# Patient Record
Sex: MALE | Race: WHITE | NOT HISPANIC OR LATINO | Employment: OTHER | URBAN - METROPOLITAN AREA
[De-identification: names, ages, dates, MRNs, and addresses within clinical notes are randomized per-mention and may not be internally consistent; named-entity substitution may affect disease eponyms.]

---

## 2017-01-16 ENCOUNTER — OFFICE VISIT (OUTPATIENT)
Dept: NEUROSURGERY | Facility: CLINIC | Age: 52
End: 2017-01-16

## 2017-01-16 ENCOUNTER — TRANSCRIBE ORDERS (OUTPATIENT)
Dept: NEUROSURGERY | Facility: CLINIC | Age: 52
End: 2017-01-16

## 2017-01-16 VITALS
DIASTOLIC BLOOD PRESSURE: 89 MMHG | HEIGHT: 72 IN | WEIGHT: 180 LBS | HEART RATE: 104 BPM | SYSTOLIC BLOOD PRESSURE: 130 MMHG | BODY MASS INDEX: 24.38 KG/M2

## 2017-01-16 DIAGNOSIS — M51.16 LUMBAR DISC HERNIATION WITH RADICULOPATHY: Primary | ICD-10-CM

## 2017-01-16 DIAGNOSIS — M51.36 DEGENERATION OF LUMBAR INTERVERTEBRAL DISC: ICD-10-CM

## 2017-01-16 PROBLEM — M51.369 DEGENERATION OF LUMBAR INTERVERTEBRAL DISC: Status: ACTIVE | Noted: 2017-01-16

## 2017-01-16 PROCEDURE — 99244 OFF/OP CNSLTJ NEW/EST MOD 40: CPT | Performed by: NEUROLOGICAL SURGERY

## 2017-01-16 RX ORDER — CYANOCOBALAMIN 1000 UG/ML
1000 INJECTION, SOLUTION INTRAMUSCULAR; SUBCUTANEOUS
COMMUNITY
Start: 2016-11-06

## 2017-01-16 RX ORDER — OMEPRAZOLE 20 MG/1
20 CAPSULE, DELAYED RELEASE ORAL DAILY
COMMUNITY

## 2017-01-16 RX ORDER — DIPHENOXYLATE HYDROCHLORIDE AND ATROPINE SULFATE 2.5; .025 MG/1; MG/1
1 TABLET ORAL 4 TIMES DAILY PRN
COMMUNITY

## 2017-01-16 RX ORDER — DULOXETIN HYDROCHLORIDE 60 MG/1
90 CAPSULE, DELAYED RELEASE ORAL DAILY
COMMUNITY

## 2017-01-16 RX ORDER — HYDROCODONE BITARTRATE AND ACETAMINOPHEN 7.5; 325 MG/1; MG/1
1 TABLET ORAL EVERY 6 HOURS PRN
COMMUNITY
Start: 2017-01-04 | End: 2017-03-10 | Stop reason: HOSPADM

## 2017-01-16 RX ORDER — PROMETHAZINE HYDROCHLORIDE 25 MG/1
25 TABLET ORAL EVERY 6 HOURS PRN
COMMUNITY
Start: 2017-01-11

## 2017-01-16 RX ORDER — MAGNESIUM GLUCONATE 27 MG(500)
500 TABLET ORAL 2 TIMES DAILY
COMMUNITY
End: 2020-12-30

## 2017-01-16 RX ORDER — CLONAZEPAM 0.5 MG/1
0.5 TABLET ORAL 3 TIMES DAILY PRN
COMMUNITY
Start: 2017-01-11

## 2017-01-16 RX ORDER — GABAPENTIN 300 MG/1
300 CAPSULE ORAL 2 TIMES DAILY
Qty: 60 CAPSULE | Refills: 3 | Status: SHIPPED | OUTPATIENT
Start: 2017-01-16 | End: 2017-05-05 | Stop reason: SDUPTHER

## 2017-01-16 RX ORDER — CYCLOBENZAPRINE HCL 10 MG
10 TABLET ORAL 4 TIMES DAILY
COMMUNITY
Start: 2017-01-05

## 2017-01-16 NOTE — LETTER
January 16, 2017     Skip Marley MD  [address]  [address]  [facility] [address]    Patient: Jeronimo Dai   YOB: 1965   Date of Visit: 1/16/2017       Dear Dr. Ayaka MD:    Thank you for referring Jeronimo Dai to me for evaluation. Below are the relevant portions of my assessment and plan of care.      Independent Review of Radiographic Studies:    I reviewed the lumbar MRI done recently on 11/30/16 which showed a very large left L5-S1 recurrent herniated disc and a smaller right L4 5 recurrent herniated disc.  I agree with the report.      Medical Decision Making:    I described and recommended an open lumbar left L5/S1 microdiscectomy.  The goal of surgery is relief of radiating leg pain with improvement of numbness, tingling, walking, and quality of life.  This will not help or hinder low back pain.  The risks include, but are not limited to, infection, hemorrhage on transfusion or reoperation, CSF leak requiring reoperation, incomplete relief of symptoms, potential need for additional surgeries in the future including a fusion, stroke, paralysis, coma, and death.  The patient will find out if I am on his provider list, and if not, we will send him to Dr. Britni Horn. If so, we'll move forward with the surgery at [facility].   I've given him some gabapentin to help a bit with the sciatic pain.      Jeronimo was seen today for back pain.    Diagnoses and all orders for this visit:    Lumbar disc herniation with radiculopathy    Degeneration of lumbar intervertebral disc    Other orders  -     gabapentin (NEURONTIN) 300 MG capsule; Take 1 capsule by mouth 2 (Two) Times a Day.    No Follow-up on file.                    If you have questions, please do not hesitate to call me. I look forward to following Jeronimo along with you.         Sincerely,        Drew Taylor MD        CC: No Recipients

## 2017-01-16 NOTE — PROGRESS NOTES
Subjective   Patient ID: Jeronimo Dai is a 51 y.o. male is being seen for consultation today at the request of Skip Marley MD for evaluation of back and left leg pain.      The patient notes that his pain symptoms began in October 2016 spontaneously after spending extended time in his bed.  He notes that his leg pain symptoms are worse than his back pain symptoms.    He notes that he has been treated with MARY (last one in November) and has been following with Dr. Marley for pain management.  He notes that he has not been treated with PT for his pain symptoms.  He does note history of previous back surgery x2 by Dr. Hahn in the past.    Back Pain   This is a chronic problem. The current episode started more than 1 month ago. The problem occurs daily. The problem has been gradually worsening since onset. The pain is present in the lumbar spine. The quality of the pain is described as aching. The pain radiates to the left knee and left thigh. Associated symptoms include abdominal pain and numbness. Pertinent negatives include no bladder incontinence, bowel incontinence, tingling or weakness. Treatments tried: MARY. The treatment provided no relief.       The following portions of the patient's history were reviewed and updated as appropriate: allergies, current medications, past family history, past medical history, past social history, past surgical history and problem list.    The patient has a history of having had a left L5-S1 discectomy in 2007 and a right L4-L5 discectomy 2009.  He's were done by Dr. Hahn. There was some residual symptoms in the right leg and leg after the second surgery but the first surgery seemed to have resolved his left leg pain quite well.  He's had some off and on low back pain that he was treated with by a chiropractor.  He has had been on some long-term narcotics for his back pain but was generally doing well until about 3 months ago when he began having severe  recurrent left buttock and leg pain.  He was found to have a recurrent large L5-S1 herniated disc.  He tried blocks which haven't helped.  He has not had physical therapy but he's been to the chiropractor.  He has not been on gabapentin.  He's continue to work on these had trouble doing it.  We discussed the nature of his recurrent disc herniation.  Is quite large.  I don't think it's likely to get better at this point and I think an open revision left L5-S1 microdiscectomy would be helpful.  I don't think anything needs to be done about the recurrence on the right at L4-L5.  Right now he doesn't have radiating leg pain on the right in its just upper buttock pain and some low back pain he knows that there is a possibility he might need to be fused in the future.  He is not sure a month provider list with his insurance.  I think he needs to have the surgery but if I can't do it, we'll send him to Dr. Britni Horn.  I'll give him some gabapentin to tide him over in terms of his radiating leg pain.  He has no motor deficits and mild mechanical signs.  No bowel or bladder incontinence.      Review of Systems   Constitutional: Positive for fatigue.   HENT: Positive for congestion and sinus pressure.    Gastrointestinal: Positive for abdominal pain. Negative for bowel incontinence.   Genitourinary: Negative for bladder incontinence.   Musculoskeletal: Positive for arthralgias, back pain and neck pain.   Allergic/Immunologic: Positive for environmental allergies.   Neurological: Positive for numbness. Negative for tingling and weakness.   Psychiatric/Behavioral: Positive for agitation and dysphoric mood. The patient is nervous/anxious.    All other systems reviewed and are negative.      Objective   Physical Exam   Constitutional: He is oriented to person, place, and time. He appears well-developed and well-nourished.   HENT:   Head: Normocephalic and atraumatic.   Eyes: Conjunctivae and EOM are normal. Pupils are equal,  round, and reactive to light.   Fundoscopic exam:       The right eye shows no papilledema. The right eye shows venous pulsations.        The left eye shows no papilledema. The left eye shows venous pulsations.   Neck: Carotid bruit is not present.   Neurological: He is oriented to person, place, and time. He has a normal Finger-Nose-Finger Test and a normal Heel to Shin Test. Gait normal.   Reflex Scores:       Tricep reflexes are 2+ on the right side and 2+ on the left side.       Bicep reflexes are 2+ on the right side and 2+ on the left side.       Brachioradialis reflexes are 2+ on the right side and 2+ on the left side.       Patellar reflexes are 2+ on the right side and 2+ on the left side.       Achilles reflexes are 2+ on the right side and 2+ on the left side.  Psychiatric: His speech is normal.     Neurologic Exam     Mental Status   Oriented to person, place, and time.   Registration of memory: Good recent and remote memory.   Attention: normal. Concentration: normal.   Speech: speech is normal   Level of consciousness: alert  Knowledge: consistent with education.     Cranial Nerves     CN II   Visual fields full to confrontation.   Visual acuity: normal    CN III, IV, VI   Pupils are equal, round, and reactive to light.  Extraocular motions are normal.     CN V   Facial sensation intact.   Right corneal reflex: normal  Left corneal reflex: normal    CN VII   Facial expression full, symmetric.   Right facial weakness: none  Left facial weakness: none    CN VIII   Hearing: intact    CN IX, X   Palate: symmetric    CN XI   Right sternocleidomastoid strength: normal  Left sternocleidomastoid strength: normal    CN XII   Tongue: not atrophic  Tongue deviation: none    Motor Exam   Muscle bulk: normal  Right arm tone: normal  Left arm tone: normal  Right leg tone: normal  Left leg tone: normal    Strength   Strength 5/5 except as noted.     Sensory Exam   Light touch normal.     Gait, Coordination, and  Reflexes     Gait  Gait: normal    Coordination   Finger to nose coordination: normal  Heel to shin coordination: normal    Reflexes   Right brachioradialis: 2+  Left brachioradialis: 2+  Right biceps: 2+  Left biceps: 2+  Right triceps: 2+  Left triceps: 2+  Right patellar: 2+  Left patellar: 2+  Right achilles: 2+  Left achilles: 2+  Right : 2+  Left : 2+      Assessment/Plan   Independent Review of Radiographic Studies:    I reviewed the lumbar MRI done recently on 11/30/16 which showed a very large left L5-S1 recurrent herniated disc and a smaller right L4 5 recurrent herniated disc.  I agree with the report.      Medical Decision Making:    I described and recommended an open lumbar left L5/S1 microdiscectomy.  The goal of surgery is relief of radiating leg pain with improvement of numbness, tingling, walking, and quality of life.  This will not help or hinder low back pain.  The risks include, but are not limited to, infection, hemorrhage on transfusion or reoperation, CSF leak requiring reoperation, incomplete relief of symptoms, potential need for additional surgeries in the future including a fusion, stroke, paralysis, coma, and death.  The patient will find out if I am on his provider list, and if not, we will send him to Dr. Britni Horn. If so, we'll move forward with the surgery at [facility].   I've given him some gabapentin to help a bit with the sciatic pain.      Jeronimo was seen today for back pain.    Diagnoses and all orders for this visit:    Lumbar disc herniation with radiculopathy    Degeneration of lumbar intervertebral disc    Other orders  -     gabapentin (NEURONTIN) 300 MG capsule; Take 1 capsule by mouth 2 (Two) Times a Day.    No Follow-up on file.

## 2017-02-15 ENCOUNTER — OFFICE (OUTPATIENT)
Dept: URBAN - METROPOLITAN AREA OTHER 6 | Facility: OTHER | Age: 52
End: 2017-02-15

## 2017-02-15 VITALS
HEART RATE: 88 BPM | HEIGHT: 72 IN | WEIGHT: 190 LBS | DIASTOLIC BLOOD PRESSURE: 90 MMHG | SYSTOLIC BLOOD PRESSURE: 130 MMHG

## 2017-02-15 DIAGNOSIS — R11.0 NAUSEA: ICD-10-CM

## 2017-02-15 DIAGNOSIS — K50.90 CROHN'S DISEASE, UNSPECIFIED, WITHOUT COMPLICATIONS: ICD-10-CM

## 2017-02-15 DIAGNOSIS — A04.9 BACTERIAL INTESTINAL INFECTION, UNSPECIFIED: ICD-10-CM

## 2017-02-15 PROCEDURE — 99212 OFFICE O/P EST SF 10 MIN: CPT | Performed by: INTERNAL MEDICINE

## 2017-02-21 ENCOUNTER — TRANSCRIBE ORDERS (OUTPATIENT)
Dept: NEUROSURGERY | Facility: CLINIC | Age: 52
End: 2017-02-21

## 2017-03-03 ENCOUNTER — APPOINTMENT (OUTPATIENT)
Dept: PREADMISSION TESTING | Facility: HOSPITAL | Age: 52
End: 2017-03-03

## 2017-03-06 ENCOUNTER — HOSPITAL ENCOUNTER (OUTPATIENT)
Dept: GENERAL RADIOLOGY | Facility: HOSPITAL | Age: 52
Discharge: HOME | End: 2017-03-06
Attending: NEUROLOGICAL SURGERY | Admitting: NEUROLOGICAL SURGERY

## 2017-03-06 ENCOUNTER — APPOINTMENT (OUTPATIENT)
Dept: PREADMISSION TESTING | Facility: HOSPITAL | Age: 52
End: 2017-03-06

## 2017-03-06 VITALS
HEART RATE: 106 BPM | SYSTOLIC BLOOD PRESSURE: 127 MMHG | OXYGEN SATURATION: 97 % | WEIGHT: 201.9 LBS | RESPIRATION RATE: 16 BRPM | HEIGHT: 72 IN | DIASTOLIC BLOOD PRESSURE: 92 MMHG | BODY MASS INDEX: 27.35 KG/M2 | TEMPERATURE: 97.8 F

## 2017-03-06 LAB
ANION GAP SERPL CALCULATED.3IONS-SCNC: 15.3 MMOL/L
APTT PPP: 28.3 SECONDS (ref 22.7–35.4)
BASOPHILS # BLD AUTO: 0.02 10*3/MM3 (ref 0–0.2)
BASOPHILS NFR BLD AUTO: 0.3 % (ref 0–1.5)
BILIRUB UR QL STRIP: NEGATIVE
BUN BLD-MCNC: 9 MG/DL (ref 6–20)
BUN/CREAT SERPL: 11 (ref 7–25)
CALCIUM SPEC-SCNC: 9.2 MG/DL (ref 8.6–10.5)
CHLORIDE SERPL-SCNC: 94 MMOL/L (ref 98–107)
CLARITY UR: CLEAR
CO2 SERPL-SCNC: 26.7 MMOL/L (ref 22–29)
COLOR UR: YELLOW
CREAT BLD-MCNC: 0.82 MG/DL (ref 0.76–1.27)
DEPRECATED RDW RBC AUTO: 42.5 FL (ref 37–54)
EOSINOPHIL # BLD AUTO: 0.06 10*3/MM3 (ref 0–0.7)
EOSINOPHIL NFR BLD AUTO: 0.8 % (ref 0.3–6.2)
ERYTHROCYTE [DISTWIDTH] IN BLOOD BY AUTOMATED COUNT: 12.8 % (ref 11.5–14.5)
GFR SERPL CREATININE-BSD FRML MDRD: 98 ML/MIN/1.73
GLUCOSE BLD-MCNC: 124 MG/DL (ref 65–99)
GLUCOSE UR STRIP-MCNC: NEGATIVE MG/DL
HCT VFR BLD AUTO: 42 % (ref 40.4–52.2)
HGB BLD-MCNC: 14.2 G/DL (ref 13.7–17.6)
HGB UR QL STRIP.AUTO: NEGATIVE
IMM GRANULOCYTES # BLD: 0.02 10*3/MM3 (ref 0–0.03)
IMM GRANULOCYTES NFR BLD: 0.3 % (ref 0–0.5)
INR PPP: 1.02 (ref 0.9–1.1)
KETONES UR QL STRIP: NEGATIVE
LEUKOCYTE ESTERASE UR QL STRIP.AUTO: NEGATIVE
LYMPHOCYTES # BLD AUTO: 2.28 10*3/MM3 (ref 0.9–4.8)
LYMPHOCYTES NFR BLD AUTO: 29.9 % (ref 19.6–45.3)
MCH RBC QN AUTO: 31 PG (ref 27–32.7)
MCHC RBC AUTO-ENTMCNC: 33.8 G/DL (ref 32.6–36.4)
MCV RBC AUTO: 91.7 FL (ref 79.8–96.2)
MONOCYTES # BLD AUTO: 0.47 10*3/MM3 (ref 0.2–1.2)
MONOCYTES NFR BLD AUTO: 6.2 % (ref 5–12)
NEUTROPHILS # BLD AUTO: 4.77 10*3/MM3 (ref 1.9–8.1)
NEUTROPHILS NFR BLD AUTO: 62.5 % (ref 42.7–76)
NITRITE UR QL STRIP: NEGATIVE
PH UR STRIP.AUTO: 7 [PH] (ref 5–8)
PLATELET # BLD AUTO: 214 10*3/MM3 (ref 140–500)
PMV BLD AUTO: 11.5 FL (ref 6–12)
POTASSIUM BLD-SCNC: 4.2 MMOL/L (ref 3.5–5.2)
PROT UR QL STRIP: NEGATIVE
PROTHROMBIN TIME: 13 SECONDS (ref 11.7–14.2)
RBC # BLD AUTO: 4.58 10*6/MM3 (ref 4.6–6)
SODIUM BLD-SCNC: 136 MMOL/L (ref 136–145)
SP GR UR STRIP: 1.01 (ref 1–1.03)
UROBILINOGEN UR QL STRIP: NORMAL
WBC NRBC COR # BLD: 7.62 10*3/MM3 (ref 4.5–10.7)

## 2017-03-06 NOTE — DISCHARGE INSTRUCTIONS
ARRIVAL TIME  06:00        General Instructions:  • Do not eat or drink after midnight: includes water, mints, or gum. You may brush your teeth.  • Do not smoke, chew tobacco, or drink alcohol.  • The Pre-Admission Testing nurse will instruct you to bring medications if unable to obtain an accurate list in Pre-Admission Testing.    • If applicable bring your C-PAP/ BI-PAP machine.  • Bring any papers given to you in the doctor’s office.  • Wear clean comfortable clothes and socks.  • Do not wear contact lenses or make-up.  Bring a case for your glasses if applicable.   • Bring crutches or walker if applicable.  • Leave all other valuables and jewelry at home.        Preventing a Surgical Site Infection:  Shower on the morning of surgery using a fresh bar of anti-bacterial soap (such as Dial) and clean washcloth.  Dry with a clean towel and dress in clean clothing.  For 2 to 3 days before surgery, avoid shaving with a razor near where you will have surgery because the razor can irritate skin and make it easier to develop an infection  Ask your surgeon if you will be receiving antibiotics prior to surgery  Make sure you, your family, and all healthcare providers clean their hands with soap and water or an alcohol based hand  before caring for you or your wound  If at all possible, quit smoking as many days before surgery as you can.    Day of surgery:  Upon arrival, a Pre-op nurse and Anesthesiologist will review your health history, obtain vital signs, and answer questions you may have.  The only belongings needed at this time will be your home medications and if applicable your C-PAP/BI-PAP machine.  If you are staying overnight your family can leave the rest of your belongings in the car and bring them to your room later.  A Pre-op nurse will start an IV and you may receive medication in preparation for surgery, including something to help you relax.  Your family will be able to see you in the Pre-op area.   While you are in surgery your family should notify the waiting room  if they leave the waiting room area and provide a contact phone number.    Please be aware that surgery does come with discomfort.  We want to make every effort to control your discomfort so please discuss any uncontrolled symptoms with your nurse.   Your doctor will most likely have prescribed pain medications.      If you are going home after surgery you will receive individualized written care instructions before being discharged.  A responsible adult must drive you to and from the hospital on the day of your surgery and stay with you for 24 hours.    If you are staying overnight following surgery, you will be transported to your hospital room following the recovery period.  [facility] has all private rooms.    If you have any questions please call Pre-Admission Testing at [phone].  Deductibles and co-payments are collected on the day of service. Please be prepared to pay the required co-pay, deductible or deposit on the day of service as defined by your plan.

## 2017-03-09 ENCOUNTER — APPOINTMENT (OUTPATIENT)
Dept: GENERAL RADIOLOGY | Facility: HOSPITAL | Age: 52
End: 2017-03-09

## 2017-03-09 ENCOUNTER — ANESTHESIA (OUTPATIENT)
Dept: PERIOP | Facility: HOSPITAL | Age: 52
End: 2017-03-09

## 2017-03-09 ENCOUNTER — ANESTHESIA EVENT (OUTPATIENT)
Dept: PERIOP | Facility: HOSPITAL | Age: 52
End: 2017-03-09

## 2017-03-09 ENCOUNTER — HOSPITAL ENCOUNTER (OUTPATIENT)
Facility: HOSPITAL | Age: 52
Setting detail: OBSERVATION
Discharge: HOME | End: 2017-03-10
Attending: NEUROLOGICAL SURGERY | Admitting: NEUROLOGICAL SURGERY

## 2017-03-09 PROCEDURE — 25010000002 ONDANSETRON PER 1 MG: Performed by: NURSE ANESTHETIST, CERTIFIED REGISTERED

## 2017-03-09 PROCEDURE — 63042 LAMINOTOMY SINGLE LUMBAR: CPT | Performed by: NEUROLOGICAL SURGERY

## 2017-03-09 PROCEDURE — 25010000002 FENTANYL CITRATE (PF) 100 MCG/2ML SOLUTION: Performed by: NURSE ANESTHETIST, CERTIFIED REGISTERED

## 2017-03-09 PROCEDURE — 25010000002 HYDROMORPHONE PER 4 MG: Performed by: NURSE ANESTHETIST, CERTIFIED REGISTERED

## 2017-03-09 PROCEDURE — 25010000002 ONDANSETRON PER 1 MG: Performed by: NEUROLOGICAL SURGERY

## 2017-03-09 PROCEDURE — 76000 FLUOROSCOPY <1 HR PHYS/QHP: CPT

## 2017-03-09 PROCEDURE — 25010000002 VANCOMYCIN PER 500 MG: Performed by: NEUROLOGICAL SURGERY

## 2017-03-09 PROCEDURE — G0378 HOSPITAL OBSERVATION PER HR: HCPCS

## 2017-03-09 PROCEDURE — 25010000002 METHYLPREDNISOLONE PER 80 MG: Performed by: NEUROLOGICAL SURGERY

## 2017-03-09 PROCEDURE — 72020 X-RAY EXAM OF SPINE 1 VIEW: CPT

## 2017-03-09 PROCEDURE — 25010000002 DEXAMETHASONE PER 1 MG: Performed by: NURSE ANESTHETIST, CERTIFIED REGISTERED

## 2017-03-09 PROCEDURE — 63044 LAMINOTOMY ADDL LUMBAR: CPT | Performed by: NEUROLOGICAL SURGERY

## 2017-03-09 PROCEDURE — 25010000002 HYDROMORPHONE PER 4 MG

## 2017-03-09 PROCEDURE — 25010000002 PHENYLEPHRINE PER 1 ML: Performed by: NURSE ANESTHETIST, CERTIFIED REGISTERED

## 2017-03-09 PROCEDURE — 25010000002 FENTANYL CITRATE (PF) 100 MCG/2ML SOLUTION

## 2017-03-09 PROCEDURE — 63710000001 PROMETHAZINE PER 25 MG: Performed by: NEUROLOGICAL SURGERY

## 2017-03-09 PROCEDURE — 63710000001 DIPHENHYDRAMINE PER 50 MG: Performed by: NEUROLOGICAL SURGERY

## 2017-03-09 PROCEDURE — 25010000002 VANCOMYCIN: Performed by: NEUROLOGICAL SURGERY

## 2017-03-09 PROCEDURE — 25010000002 PROPOFOL 10 MG/ML EMULSION: Performed by: NURSE ANESTHETIST, CERTIFIED REGISTERED

## 2017-03-09 PROCEDURE — 94799 UNLISTED PULMONARY SVC/PX: CPT

## 2017-03-09 PROCEDURE — 25010000002 MIDAZOLAM PER 1 MG: Performed by: ANESTHESIOLOGY

## 2017-03-09 PROCEDURE — 25010000002 NEOSTIGMINE 10 MG/10ML SOLUTION: Performed by: NURSE ANESTHETIST, CERTIFIED REGISTERED

## 2017-03-09 RX ORDER — DEXAMETHASONE SODIUM PHOSPHATE 10 MG/ML
INJECTION INTRAMUSCULAR; INTRAVENOUS AS NEEDED
Status: DISCONTINUED | OUTPATIENT
Start: 2017-03-09 | End: 2017-03-09 | Stop reason: SURG

## 2017-03-09 RX ORDER — HYDROCODONE BITARTRATE AND ACETAMINOPHEN 7.5; 325 MG/1; MG/1
2 TABLET ORAL EVERY 4 HOURS PRN
Status: DISCONTINUED | OUTPATIENT
Start: 2017-03-09 | End: 2017-03-10 | Stop reason: HOSPADM

## 2017-03-09 RX ORDER — CYCLOBENZAPRINE HCL 10 MG
10 TABLET ORAL 3 TIMES DAILY PRN
Status: DISCONTINUED | OUTPATIENT
Start: 2017-03-09 | End: 2017-03-10 | Stop reason: HOSPADM

## 2017-03-09 RX ORDER — NALOXONE HCL 0.4 MG/ML
0.1 VIAL (ML) INJECTION
Status: DISCONTINUED | OUTPATIENT
Start: 2017-03-09 | End: 2017-03-10 | Stop reason: HOSPADM

## 2017-03-09 RX ORDER — ONDANSETRON 2 MG/ML
4 INJECTION INTRAMUSCULAR; INTRAVENOUS EVERY 6 HOURS PRN
Status: DISCONTINUED | OUTPATIENT
Start: 2017-03-09 | End: 2017-03-10 | Stop reason: HOSPADM

## 2017-03-09 RX ORDER — GLYCOPYRROLATE 0.2 MG/ML
INJECTION INTRAMUSCULAR; INTRAVENOUS AS NEEDED
Status: DISCONTINUED | OUTPATIENT
Start: 2017-03-09 | End: 2017-03-09 | Stop reason: SURG

## 2017-03-09 RX ORDER — DIPHENHYDRAMINE HCL 25 MG
25 CAPSULE ORAL EVERY 6 HOURS PRN
COMMUNITY

## 2017-03-09 RX ORDER — ONDANSETRON 2 MG/ML
4 INJECTION INTRAMUSCULAR; INTRAVENOUS ONCE AS NEEDED
Status: DISCONTINUED | OUTPATIENT
Start: 2017-03-09 | End: 2017-03-09 | Stop reason: HOSPADM

## 2017-03-09 RX ORDER — SODIUM CHLORIDE, SODIUM LACTATE, POTASSIUM CHLORIDE, CALCIUM CHLORIDE 600; 310; 30; 20 MG/100ML; MG/100ML; MG/100ML; MG/100ML
9 INJECTION, SOLUTION INTRAVENOUS CONTINUOUS
Status: DISCONTINUED | OUTPATIENT
Start: 2017-03-09 | End: 2017-03-09

## 2017-03-09 RX ORDER — NEOSTIGMINE METHYLSULFATE 1 MG/ML
INJECTION, SOLUTION INTRAVENOUS AS NEEDED
Status: DISCONTINUED | OUTPATIENT
Start: 2017-03-09 | End: 2017-03-09 | Stop reason: SURG

## 2017-03-09 RX ORDER — FENTANYL CITRATE 50 UG/ML
50 INJECTION, SOLUTION INTRAMUSCULAR; INTRAVENOUS
Status: DISCONTINUED | OUTPATIENT
Start: 2017-03-09 | End: 2017-03-09 | Stop reason: HOSPADM

## 2017-03-09 RX ORDER — PROMETHAZINE HYDROCHLORIDE 25 MG/1
25 SUPPOSITORY RECTAL ONCE AS NEEDED
Status: DISCONTINUED | OUTPATIENT
Start: 2017-03-09 | End: 2017-03-09 | Stop reason: HOSPADM

## 2017-03-09 RX ORDER — NALOXONE HCL 0.4 MG/ML
0.2 VIAL (ML) INJECTION AS NEEDED
Status: DISCONTINUED | OUTPATIENT
Start: 2017-03-09 | End: 2017-03-09 | Stop reason: HOSPADM

## 2017-03-09 RX ORDER — HYDROCODONE BITARTRATE AND ACETAMINOPHEN 7.5; 325 MG/1; MG/1
1 TABLET ORAL ONCE AS NEEDED
Status: DISCONTINUED | OUTPATIENT
Start: 2017-03-09 | End: 2017-03-09 | Stop reason: HOSPADM

## 2017-03-09 RX ORDER — DULOXETIN HYDROCHLORIDE 60 MG/1
60 CAPSULE, DELAYED RELEASE ORAL DAILY
Status: DISCONTINUED | OUTPATIENT
Start: 2017-03-09 | End: 2017-03-10 | Stop reason: HOSPADM

## 2017-03-09 RX ORDER — PROMETHAZINE HYDROCHLORIDE 25 MG/1
25 TABLET ORAL EVERY 6 HOURS PRN
Status: DISCONTINUED | OUTPATIENT
Start: 2017-03-09 | End: 2017-03-10 | Stop reason: HOSPADM

## 2017-03-09 RX ORDER — ACETAMINOPHEN 325 MG/1
650 TABLET ORAL EVERY 4 HOURS PRN
Status: DISCONTINUED | OUTPATIENT
Start: 2017-03-09 | End: 2017-03-10 | Stop reason: HOSPADM

## 2017-03-09 RX ORDER — MIDAZOLAM HYDROCHLORIDE 1 MG/ML
1 INJECTION INTRAMUSCULAR; INTRAVENOUS
Status: DISCONTINUED | OUTPATIENT
Start: 2017-03-09 | End: 2017-03-09 | Stop reason: HOSPADM

## 2017-03-09 RX ORDER — IBUPROFEN 800 MG/1
800 TABLET ORAL EVERY 8 HOURS PRN
COMMUNITY
End: 2017-03-10 | Stop reason: HOSPADM

## 2017-03-09 RX ORDER — HYDROMORPHONE HYDROCHLORIDE 1 MG/ML
0.5 INJECTION, SOLUTION INTRAMUSCULAR; INTRAVENOUS; SUBCUTANEOUS
Status: DISCONTINUED | OUTPATIENT
Start: 2017-03-09 | End: 2017-03-09 | Stop reason: HOSPADM

## 2017-03-09 RX ORDER — DOCUSATE SODIUM 100 MG/1
100 CAPSULE, LIQUID FILLED ORAL 2 TIMES DAILY PRN
Status: DISCONTINUED | OUTPATIENT
Start: 2017-03-09 | End: 2017-03-10 | Stop reason: HOSPADM

## 2017-03-09 RX ORDER — HYDRALAZINE HYDROCHLORIDE 20 MG/ML
5 INJECTION INTRAMUSCULAR; INTRAVENOUS
Status: DISCONTINUED | OUTPATIENT
Start: 2017-03-09 | End: 2017-03-09 | Stop reason: HOSPADM

## 2017-03-09 RX ORDER — DIPHENHYDRAMINE HCL 25 MG
25 CAPSULE ORAL EVERY 6 HOURS PRN
Status: DISCONTINUED | OUTPATIENT
Start: 2017-03-09 | End: 2017-03-10 | Stop reason: HOSPADM

## 2017-03-09 RX ORDER — PROMETHAZINE HYDROCHLORIDE 25 MG/ML
12.5 INJECTION, SOLUTION INTRAMUSCULAR; INTRAVENOUS ONCE AS NEEDED
Status: DISCONTINUED | OUTPATIENT
Start: 2017-03-09 | End: 2017-03-09 | Stop reason: HOSPADM

## 2017-03-09 RX ORDER — DIPHENHYDRAMINE HYDROCHLORIDE 50 MG/ML
12.5 INJECTION INTRAMUSCULAR; INTRAVENOUS
Status: DISCONTINUED | OUTPATIENT
Start: 2017-03-09 | End: 2017-03-09 | Stop reason: HOSPADM

## 2017-03-09 RX ORDER — SODIUM CHLORIDE 0.9 % (FLUSH) 0.9 %
1-10 SYRINGE (ML) INJECTION AS NEEDED
Status: DISCONTINUED | OUTPATIENT
Start: 2017-03-09 | End: 2017-03-10 | Stop reason: HOSPADM

## 2017-03-09 RX ORDER — GABAPENTIN 300 MG/1
300 CAPSULE ORAL 2 TIMES DAILY
Status: DISCONTINUED | OUTPATIENT
Start: 2017-03-10 | End: 2017-03-10 | Stop reason: HOSPADM

## 2017-03-09 RX ORDER — DEXTROSE, SODIUM CHLORIDE, AND POTASSIUM CHLORIDE 5; .45; .15 G/100ML; G/100ML; G/100ML
100 INJECTION INTRAVENOUS CONTINUOUS
Status: DISCONTINUED | OUTPATIENT
Start: 2017-03-09 | End: 2017-03-10 | Stop reason: HOSPADM

## 2017-03-09 RX ORDER — HYDROMORPHONE HCL IN 0.9% NACL 10 MG/50ML
PATIENT CONTROLLED ANALGESIA SYRINGE INTRAVENOUS CONTINUOUS
Status: DISCONTINUED | OUTPATIENT
Start: 2017-03-09 | End: 2017-03-10 | Stop reason: HOSPADM

## 2017-03-09 RX ORDER — FAMOTIDINE 10 MG/ML
20 INJECTION, SOLUTION INTRAVENOUS ONCE
Status: COMPLETED | OUTPATIENT
Start: 2017-03-09 | End: 2017-03-09

## 2017-03-09 RX ORDER — METHYLPREDNISOLONE ACETATE 80 MG/ML
INJECTION, SUSPENSION INTRA-ARTICULAR; INTRALESIONAL; INTRAMUSCULAR; SOFT TISSUE AS NEEDED
Status: DISCONTINUED | OUTPATIENT
Start: 2017-03-09 | End: 2017-03-09 | Stop reason: HOSPADM

## 2017-03-09 RX ORDER — FENTANYL CITRATE 50 UG/ML
INJECTION, SOLUTION INTRAMUSCULAR; INTRAVENOUS
Status: COMPLETED
Start: 2017-03-09 | End: 2017-03-09

## 2017-03-09 RX ORDER — FENTANYL CITRATE 50 UG/ML
INJECTION, SOLUTION INTRAMUSCULAR; INTRAVENOUS AS NEEDED
Status: DISCONTINUED | OUTPATIENT
Start: 2017-03-09 | End: 2017-03-09 | Stop reason: SURG

## 2017-03-09 RX ORDER — BUPIVACAINE HYDROCHLORIDE AND EPINEPHRINE 2.5; 5 MG/ML; UG/ML
INJECTION, SOLUTION INFILTRATION; PERINEURAL AS NEEDED
Status: DISCONTINUED | OUTPATIENT
Start: 2017-03-09 | End: 2017-03-09 | Stop reason: HOSPADM

## 2017-03-09 RX ORDER — MIDAZOLAM HYDROCHLORIDE 1 MG/ML
2 INJECTION INTRAMUSCULAR; INTRAVENOUS
Status: DISCONTINUED | OUTPATIENT
Start: 2017-03-09 | End: 2017-03-09 | Stop reason: HOSPADM

## 2017-03-09 RX ORDER — PROMETHAZINE HYDROCHLORIDE 25 MG/1
25 TABLET ORAL ONCE AS NEEDED
Status: DISCONTINUED | OUTPATIENT
Start: 2017-03-09 | End: 2017-03-09 | Stop reason: HOSPADM

## 2017-03-09 RX ORDER — FLUMAZENIL 0.1 MG/ML
0.2 INJECTION INTRAVENOUS AS NEEDED
Status: DISCONTINUED | OUTPATIENT
Start: 2017-03-09 | End: 2017-03-09 | Stop reason: HOSPADM

## 2017-03-09 RX ORDER — ROCURONIUM BROMIDE 10 MG/ML
INJECTION, SOLUTION INTRAVENOUS AS NEEDED
Status: DISCONTINUED | OUTPATIENT
Start: 2017-03-09 | End: 2017-03-09 | Stop reason: SURG

## 2017-03-09 RX ORDER — HYDROMORPHONE HCL IN 0.9% NACL 10 MG/50ML
PATIENT CONTROLLED ANALGESIA SYRINGE INTRAVENOUS
Status: COMPLETED
Start: 2017-03-09 | End: 2017-03-09

## 2017-03-09 RX ORDER — GABAPENTIN 300 MG/1
300 CAPSULE ORAL 2 TIMES DAILY
Status: DISCONTINUED | OUTPATIENT
Start: 2017-03-09 | End: 2017-03-09 | Stop reason: DRUGHIGH

## 2017-03-09 RX ORDER — PROMETHAZINE HYDROCHLORIDE 25 MG/1
12.5 TABLET ORAL ONCE AS NEEDED
Status: DISCONTINUED | OUTPATIENT
Start: 2017-03-09 | End: 2017-03-09 | Stop reason: HOSPADM

## 2017-03-09 RX ORDER — ONDANSETRON 2 MG/ML
INJECTION INTRAMUSCULAR; INTRAVENOUS AS NEEDED
Status: DISCONTINUED | OUTPATIENT
Start: 2017-03-09 | End: 2017-03-09 | Stop reason: SURG

## 2017-03-09 RX ORDER — SODIUM CHLORIDE 0.9 % (FLUSH) 0.9 %
1-10 SYRINGE (ML) INJECTION AS NEEDED
Status: DISCONTINUED | OUTPATIENT
Start: 2017-03-09 | End: 2017-03-09 | Stop reason: HOSPADM

## 2017-03-09 RX ORDER — PROPOFOL 10 MG/ML
VIAL (ML) INTRAVENOUS AS NEEDED
Status: DISCONTINUED | OUTPATIENT
Start: 2017-03-09 | End: 2017-03-09 | Stop reason: SURG

## 2017-03-09 RX ORDER — LABETALOL HYDROCHLORIDE 5 MG/ML
5 INJECTION, SOLUTION INTRAVENOUS
Status: DISCONTINUED | OUTPATIENT
Start: 2017-03-09 | End: 2017-03-09 | Stop reason: HOSPADM

## 2017-03-09 RX ORDER — PANTOPRAZOLE SODIUM 40 MG/1
40 TABLET, DELAYED RELEASE ORAL
Status: DISCONTINUED | OUTPATIENT
Start: 2017-03-09 | End: 2017-03-10 | Stop reason: HOSPADM

## 2017-03-09 RX ORDER — LIDOCAINE HYDROCHLORIDE 20 MG/ML
INJECTION, SOLUTION INFILTRATION; PERINEURAL AS NEEDED
Status: DISCONTINUED | OUTPATIENT
Start: 2017-03-09 | End: 2017-03-09 | Stop reason: SURG

## 2017-03-09 RX ORDER — CLONAZEPAM 0.5 MG/1
0.5 TABLET ORAL 3 TIMES DAILY PRN
Status: DISCONTINUED | OUTPATIENT
Start: 2017-03-09 | End: 2017-03-10 | Stop reason: HOSPADM

## 2017-03-09 RX ORDER — VANCOMYCIN HYDROCHLORIDE 1 G/200ML
1000 INJECTION, SOLUTION INTRAVENOUS EVERY 12 HOURS
Status: COMPLETED | OUTPATIENT
Start: 2017-03-09 | End: 2017-03-10

## 2017-03-09 RX ORDER — OXYCODONE AND ACETAMINOPHEN 7.5; 325 MG/1; MG/1
1 TABLET ORAL ONCE AS NEEDED
Status: COMPLETED | OUTPATIENT
Start: 2017-03-09 | End: 2017-03-09

## 2017-03-09 RX ADMIN — PROMETHAZINE HYDROCHLORIDE 25 MG: 25 TABLET ORAL at 21:40

## 2017-03-09 RX ADMIN — FENTANYL CITRATE 50 MCG: 50 INJECTION INTRAMUSCULAR; INTRAVENOUS at 10:49

## 2017-03-09 RX ADMIN — ONDANSETRON 4 MG: 2 INJECTION INTRAMUSCULAR; INTRAVENOUS at 19:34

## 2017-03-09 RX ADMIN — SODIUM CHLORIDE, POTASSIUM CHLORIDE, SODIUM LACTATE AND CALCIUM CHLORIDE: 600; 310; 30; 20 INJECTION, SOLUTION INTRAVENOUS at 08:09

## 2017-03-09 RX ADMIN — VANCOMYCIN HYDROCHLORIDE 1250 MG: 1 INJECTION, POWDER, LYOPHILIZED, FOR SOLUTION INTRAVENOUS at 08:01

## 2017-03-09 RX ADMIN — MIDAZOLAM HYDROCHLORIDE 2 MG: 1 INJECTION, SOLUTION INTRAMUSCULAR; INTRAVENOUS at 07:04

## 2017-03-09 RX ADMIN — HYDROCODONE BITARTRATE AND ACETAMINOPHEN 2 TABLET: 7.5; 325 TABLET ORAL at 13:59

## 2017-03-09 RX ADMIN — FAMOTIDINE 20 MG: 10 INJECTION, SOLUTION INTRAVENOUS at 07:04

## 2017-03-09 RX ADMIN — DIPHENHYDRAMINE HYDROCHLORIDE 25 MG: 25 CAPSULE ORAL at 21:42

## 2017-03-09 RX ADMIN — HYDROMORPHONE HYDROCHLORIDE 0.5 MG: 1 INJECTION, SOLUTION INTRAMUSCULAR; INTRAVENOUS; SUBCUTANEOUS at 12:10

## 2017-03-09 RX ADMIN — SODIUM CHLORIDE, POTASSIUM CHLORIDE, SODIUM LACTATE AND CALCIUM CHLORIDE: 600; 310; 30; 20 INJECTION, SOLUTION INTRAVENOUS at 10:00

## 2017-03-09 RX ADMIN — FENTANYL CITRATE 50 MCG: 50 INJECTION INTRAMUSCULAR; INTRAVENOUS at 11:24

## 2017-03-09 RX ADMIN — CYCLOBENZAPRINE HYDROCHLORIDE 10 MG: 10 TABLET, FILM COATED ORAL at 23:48

## 2017-03-09 RX ADMIN — CYCLOBENZAPRINE HYDROCHLORIDE 10 MG: 10 TABLET, FILM COATED ORAL at 15:52

## 2017-03-09 RX ADMIN — GABAPENTIN 300 MG: 300 CAPSULE ORAL at 15:52

## 2017-03-09 RX ADMIN — PROPOFOL 200 MG: 10 INJECTION, EMULSION INTRAVENOUS at 08:12

## 2017-03-09 RX ADMIN — ONDANSETRON 4 MG: 2 INJECTION INTRAMUSCULAR; INTRAVENOUS at 10:44

## 2017-03-09 RX ADMIN — LIDOCAINE HYDROCHLORIDE 100 MG: 20 INJECTION, SOLUTION INFILTRATION; PERINEURAL at 08:12

## 2017-03-09 RX ADMIN — FENTANYL CITRATE 50 MCG: 50 INJECTION INTRAMUSCULAR; INTRAVENOUS at 12:21

## 2017-03-09 RX ADMIN — POTASSIUM CHLORIDE, DEXTROSE MONOHYDRATE AND SODIUM CHLORIDE 100 ML/HR: 150; 5; 450 INJECTION, SOLUTION INTRAVENOUS at 13:59

## 2017-03-09 RX ADMIN — FENTANYL CITRATE 50 MCG: 50 INJECTION INTRAMUSCULAR; INTRAVENOUS at 11:45

## 2017-03-09 RX ADMIN — PHENYLEPHRINE HYDROCHLORIDE 100 MCG: 10 INJECTION INTRAVENOUS at 09:14

## 2017-03-09 RX ADMIN — DEXAMETHASONE SODIUM PHOSPHATE 4 MG: 10 INJECTION INTRAMUSCULAR; INTRAVENOUS at 08:21

## 2017-03-09 RX ADMIN — HYDROMORPHONE HYDROCHLORIDE 0.5 MG: 1 INJECTION, SOLUTION INTRAMUSCULAR; INTRAVENOUS; SUBCUTANEOUS at 11:24

## 2017-03-09 RX ADMIN — HYDROCODONE BITARTRATE AND ACETAMINOPHEN 2 TABLET: 7.5; 325 TABLET ORAL at 17:55

## 2017-03-09 RX ADMIN — PHENYLEPHRINE HYDROCHLORIDE 100 MCG: 10 INJECTION INTRAVENOUS at 10:22

## 2017-03-09 RX ADMIN — NEOSTIGMINE METHYLSULFATE 3 MG: 1 INJECTION INTRAVENOUS at 10:44

## 2017-03-09 RX ADMIN — DULOXETINE HYDROCHLORIDE 60 MG: 60 CAPSULE, DELAYED RELEASE ORAL at 15:52

## 2017-03-09 RX ADMIN — VANCOMYCIN HYDROCHLORIDE 1250 MG: 1 INJECTION, POWDER, LYOPHILIZED, FOR SOLUTION INTRAVENOUS at 08:09

## 2017-03-09 RX ADMIN — DIPHENHYDRAMINE HYDROCHLORIDE 25 MG: 25 CAPSULE ORAL at 13:59

## 2017-03-09 RX ADMIN — GLYCOPYRROLATE 0.5 MG: 0.2 INJECTION INTRAMUSCULAR; INTRAVENOUS at 10:44

## 2017-03-09 RX ADMIN — VANCOMYCIN HYDROCHLORIDE 1000 MG: 1 INJECTION, SOLUTION INTRAVENOUS at 15:52

## 2017-03-09 RX ADMIN — FENTANYL CITRATE 50 MCG: 50 INJECTION INTRAMUSCULAR; INTRAVENOUS at 12:54

## 2017-03-09 RX ADMIN — FENTANYL CITRATE 50 MCG: 50 INJECTION INTRAMUSCULAR; INTRAVENOUS at 08:33

## 2017-03-09 RX ADMIN — FENTANYL CITRATE 50 MCG: 50 INJECTION INTRAMUSCULAR; INTRAVENOUS at 08:12

## 2017-03-09 RX ADMIN — PHENYLEPHRINE HYDROCHLORIDE 100 MCG: 10 INJECTION INTRAVENOUS at 09:02

## 2017-03-09 RX ADMIN — OXYCODONE HYDROCHLORIDE AND ACETAMINOPHEN 1 TABLET: 7.5; 325 TABLET ORAL at 12:20

## 2017-03-09 RX ADMIN — HYDROCODONE BITARTRATE AND ACETAMINOPHEN 2 TABLET: 7.5; 325 TABLET ORAL at 23:48

## 2017-03-09 RX ADMIN — Medication: at 11:42

## 2017-03-09 RX ADMIN — ROCURONIUM BROMIDE 40 MG: 10 INJECTION INTRAVENOUS at 08:12

## 2017-03-09 RX ADMIN — PHENYLEPHRINE HYDROCHLORIDE 100 MCG: 10 INJECTION INTRAVENOUS at 09:30

## 2017-03-09 RX ADMIN — PHENYLEPHRINE HYDROCHLORIDE 100 MCG: 10 INJECTION INTRAVENOUS at 08:50

## 2017-03-09 RX ADMIN — PHENYLEPHRINE HYDROCHLORIDE 100 MCG: 10 INJECTION INTRAVENOUS at 09:18

## 2017-03-09 NOTE — PROGRESS NOTES
Discharge Planning Assessment  [facility]     Patient Name: Jeronimo Dai  MRN: [id number]  Today's Date: 3/9/2017    Admit Date: 3/9/2017          Discharge Needs Assessment       03/09/17 1447    Living Environment    Lives With alone    Provides Primary Care For no one    Quality Of Family Relationships supportive    Able to Return to Prior Living Arrangements yes    Discharge Needs Assessment    Readmission Within The Last 30 Days no previous admission in last 30 days    Equipment Currently Used at Home none    Equipment Needed After Discharge none    Transportation Available car;family or friend will provide    Discharge Disposition home or self-care            Discharge Plan       03/09/17 1449    Case Management/Social Work Plan    Plan To friend's home for assistance    Patient/Family In Agreement With Plan yes    Additional Comments Met with patient and friend at bedside.  Patient lives alone in an apartment.  Plan is to go to his friend's home at discharge.  Patient was indep with all activities prior to surgery.  Verified facesheet info.        Discharge Placement     No information found                Demographic Summary       03/09/17 1446    Referral Information    Admission Type observation    Arrived From operating room    Referral Source physician    Reason For Consult discharge planning    Record Reviewed medical record    Primary Care Physician Information    Name Dr. Clark Telles            Functional Status       03/09/17 1446    Functional Status Current    Ambulation 2-->assistive person    Transferring 2-->assistive person    Toileting 2-->assistive person    Bathing 2-->assistive person    Dressing 2-->assistive person    Eating 0-->independent    Communication 0-->understands/communicates without difficulty    Swallowing (if score 2 or more for any item, consult Rehab Services) 0-->swallows foods/liquids without difficulty    Change in Functional Status Since Onset of Current  Illness/Injury yes    Functional Status Prior    Ambulation 0-->independent    Transferring 0-->independent    Toileting 0-->independent    Bathing 0-->independent    Dressing 0-->independent    Communication 0-->understands/communicates without difficulty    Swallowing 0-->swallows foods/liquids without difficulty    IADL    Medications independent    Meal Preparation independent    Housekeeping independent    Laundry independent    Shopping independent    Oral Care independent    Activity Tolerance    Current Activity Limitations lifting restrictions;spinal precautions    Usual Activity Tolerance good    Current Activity Tolerance moderate    Cognitive/Perceptual/Developmental    Current Mental Status/Cognitive Functioning no deficits noted    Recent Changes in Mental Status/Cognitive Functioning no changes            Psychosocial     None            Abuse/Neglect     None            Legal     None            Substance Abuse     None            Patient Forms     None          Bee Medeiros, RN

## 2017-03-09 NOTE — OP NOTE
DATE OF PROCEDURE:  03/09/2017    PREOPERATIVE DIAGNOSES:   1.  Left L5-S1 recurrent herniated lumbar disk.   2.  Right L4-L5 recurrent herniated lumbar disk.     POSTOPERATIVE DIAGNOSES:  1.  Left L5-S1 recurrent herniated lumbar disk.   2.  Right L4-L5 recurrent herniated lumbar disk.     PROCEDURES PERFORMED:  1.  Open left L5-S1 revision diskectomy.   2.  Open right L4-L5 revision diskectomy.     SURGEON:  Drew Taylor MD     ASSISTANT:  PAM Goel; greatly assisted in the exposure, control of bleeding, retraction, and closure of the incision.     ANESTHESIA:  General endotracheal.     ESTIMATED BLOOD LOSS:  100 mL.     COMPLICATIONS:  None.     SPECIMENS:  None.     DRAINS:  None.     FINDINGS:  Very large right L4-L5 disk extrusion.     POSTOPERATIVE CONDITION:  Stable.     INDICATIONS:  The patient is a 51-year-old gentleman with a history of previous surgery on the right at L4-L5 and the left at L5-S1. He developed initially severe left buttock and leg pain from a known left L5-S1 recurrent herniated disk. We have known about a right L4-L5 herniated disk, but at the time of the last visit he only complained of left leg pain; but since then, he began to develop radiating right leg pain which was even worse than the left leg pain. So he was brought to the operating room to do 2 levels today,  even though the original intent was to do just the left side. I had discussed this with him prior to the surgery and we were both in agreement.     INFORMED CONSENT:  He understood the goal of surgery was relief of his radiating buttock and leg pain and improvement of his numbness, tingling, and his weakness. He knew that overall this would not help or hinder his middle to low back pain which was significant. The risks include but are not limited to infection, hemorrhage requiring transfusion, hemorrhage requiring reoperation, CSF leak requiring reoperation, incomplete relief of symptoms, potential need  for additional surgeries in the future including a fusion, stroke, paralysis, coma, and death. He agreed to proceed.     DESCRIPTION OF PROCEDURE:  The patient was taken to the operating room and remained in his gurney in a supine position. After induction and endotracheal intubation, he was rolled over in the prone position on a radiolucent [place] spinal table. All pressure points were padded, including the brachial plexus. He got 2 g of Kefzol as per the SCIP protocol. SCDs were used. The lumbar region was then prepped and draped in the usual sterile fashion. We did a surgical timeout.     I injected 20 mL of 0.25% Marcaine with epinephrine into the paraspinous musculature. Bilaterally, I made an incision and excised the old scar with a #10 skin knife. The incision went from the L4 to the S1 level. Hemostasis was obtained with monopolar cautery. Dissection was taken all the way down to the lumbar fascia which was divided to the left and to the right from L4 to S1. Starting first on the left side, I put a Jes retractor and got a picture with the Jes retractor at the L5-S1 level on the left with C-arm. We eventually got another picture with the needle in the L5-S1 disk space. There is quite a bit of scar tissue in the interlaminar space. We brought in the microscope which greatly helped with the performance of microneurosurgical dissection. It had been draped sterilely. Using a 3 mm matchstick on the pneumatic [employer] air drill, I did a generous left L5-S1 revision hemilaminectomy, partial medial facetectomy, and foraminotomy. I dissected scar tissue out with straight and angled curettes, and was able to identify the dural edge and enlarged it, so that I could identify the S1 nerve on the left descending over the disk space. There was a contained extrusion as we saw on the MRI done in November of last year. I incised the disk space, and did a revision internal decompression and diskectomy, freeing up the S1  root in particular on the left. No CSF leak was seen. The S1 nerve root on the left was completely pulsatile afterwards. Hemostasis was obtained with thrombin-soaked Gelfoam. I switched over to the right side and placed a Jes retractor at the right L4-L5 level, verified by x-ray, and then eventually a needle in the L4-L5 disk space. Under magnification, I did an open revision right L4-L5 hemilaminectomy, partial medial facetectomy, and foraminotomy. I identified the dural edge with straight and angled curettes, and then completed the revision laminectomy to identify first the right L5 and the right L4 nerve roots. I noted that there was an extremely large extrusion that was even worse than the MRI that was done in November which corresponded to his right leg getting worse when he was moving a few weeks ago. I incised the disk space at L4-L5, and removed a very large extruded portion (it was partially calcified) using reverse angle curettes, pituitaries, and even punches. When that was done, the L5 root was completely decompressed, as was the L4 root. Hemostasis was obtained. Floseal was used. Depo-Medrol 80 mg was used, divided between both sides. The Jes retractors were removed. The fascia was reapproximated with 0 Vicryl interrupted suture. The subcutaneous layer was closed with 2-0 interrupted Vicryl sutures. The skin was closed with a running 4-0 Vicryl subcuticular. Steri-Strips and a clean, dry dressing were placed. Patient tolerated procedure well. There are no complications. All counts correct. He was rolled over in supine position, extubated, and taken to the recovery room in satisfactory condition.       Drew Taylor M.D.  WGV:ms  D:   03/09/2017 11:12:41  T:   03/09/2017 15:32:24  Job ID:   [id number]  Document ID:   [id number]  cc:

## 2017-03-09 NOTE — ANESTHESIA POSTPROCEDURE EVALUATION
Patient: Jeronimo Dai    Procedure Summary     Date Anesthesia Start Anesthesia Stop Room / Location    03/09/17 0809 1111 [facility] OR 20 / [facility] OR       Procedure Diagnosis Surgeon Provider    OPEN REVISION L5/S1 DISCECTOMY POSTERIOR AND L4-5 RIGHT (N/A Spine Lumbar) Lumbar disc herniation with radiculopathy  (Lumbar disc herniation with radiculopathy [M51.16]) MD González Katz MD          Anesthesia Type: general  Last vitals  /75 (03/09/17 1230)    Temp 36.9 °C (98.4 °F) (03/09/17 1230)    Pulse 97 (03/09/17 1230)   Resp 16 (03/09/17 1230)    SpO2 96 % (03/09/17 1230)      Post Anesthesia Care and Evaluation    Patient location during evaluation: PACU  Anesthetic complications: No anesthetic complications

## 2017-03-09 NOTE — ANESTHESIA PROCEDURE NOTES
Airway  Urgency: elective    Date/Time: 3/9/2017 8:15 AM  Airway not difficult    General Information and Staff    Patient location during procedure: OR  Anesthesiologist: GUANACO MUNOZ  CRNA: ELIZABETH BALDWIN    Indications and Patient Condition  Indications for airway management: airway protection    Preoxygenated: yes  MILS maintained throughout  Mask difficulty assessment: 2 - vent by mask + OA or adjuvant +/- NMBA    Final Airway Details  Final airway type: endotracheal airway      Successful airway: ETT  Cuffed: yes   Successful intubation technique: direct laryngoscopy  Facilitating devices/methods: intubating stylet  Endotracheal tube insertion site: oral  Blade: Rodriguez  Blade size: #4  ETT size: 7.5 mm  Cormack-Lehane Classification: grade IIa - partial view of glottis  Placement verified by: chest auscultation and capnometry   Cuff volume (mL): 7  Measured from: lips  ETT to lips (cm): 21  Number of attempts at approach: 1    Additional Comments  Patient in OR. Monitors on. BLVS. Pre 02 100%. SIVI. DL x1. DVVC. Atraumatic placement of ETT. Placement verified with ETC02 and BBS. ETT secured. Teeth/lips in pre-op condition.

## 2017-03-09 NOTE — BRIEF OP NOTE
LUMBAR LAMINECTOMY DISCECTOMY DECOMPRESSION POSTERIOR 1-2 LEVELS  Procedure Note    Jeronimo Dai  3/9/2017    Pre-op Diagnosis:   Lumbar disc herniation with radiculopathy [M51.16] recurrent Left L5/S1, recurrent right L4/5    Post-op Diagnosis:     Post-Op Diagnosis Codes:     * Lumbar disc herniation with radiculopathy [M51.16] same as above    Procedure/CPT® Codes:      Procedure(s):  OPEN REVISION L5/S1 DISCECTOMY POSTERIOR AND L4-5 RIGHT revision discectomy    Surgeon(s):  Drew Taylor MD    Anesthesia: General    Staff:   Circulator: Delilah Thornton RN; Denice Franco RN  Radiology Technologist: Lolis Dennis RRT; Victorina Khan  Scrub Person: Arabella Carter; Vee Santiago  Assistant: Stacia De Paz CSA    Estimated Blood Loss: 150 mL  Urine Voided: * No values recorded between 3/9/2017  8:09 AM and 3/9/2017 11:04 AM *    Specimens:                * No specimens in log *      Drains: none          Findings: large right L4/5 extrusion    Complications: none      Drew Taylor MD     Date: 3/9/2017  Time: 11:04 AM

## 2017-03-09 NOTE — H&P
Progress Notes  Encounter Date: 1/16/2017  Drew Taylor MD   Neurosurgery   Expand All Collapse All    []Hide copied text  []Hover for attribution information  Subjective     Patient ID: Jeronimo Dai is a 51 y.o. male is being seen for consultation today at the request of Skip Marley MD for evaluation of back and left leg pain.      The patient notes that his pain symptoms began in October 2016 spontaneously after spending extended time in his bed. He notes that his leg pain symptoms are worse than his back pain symptoms.     He notes that he has been treated with MARY (last one in November) and has been following with Dr. Marley for pain management. He notes that he has not been treated with PT for his pain symptoms. He does note history of previous back surgery x2 by Dr. Hahn in the past.     Back Pain   This is a chronic problem. The current episode started more than 1 month ago. The problem occurs daily. The problem has been gradually worsening since onset. The pain is present in the lumbar spine. The quality of the pain is described as aching. The pain radiates to the left knee and left thigh. Associated symptoms include abdominal pain and numbness. Pertinent negatives include no bladder incontinence, bowel incontinence, tingling or weakness. Treatments tried: MARY. The treatment provided no relief.         The following portions of the patient's history were reviewed and updated as appropriate: allergies, current medications, past family history, past medical history, past social history, past surgical history and problem list.     The patient has a history of having had a left L5-S1 discectomy in 2007 and a right L4-L5 discectomy 2009. He's were done by Dr. Hahn. There was some residual symptoms in the right leg and leg after the second surgery but the first surgery seemed to have resolved his left leg pain quite well. He's had some off and on low back pain that he was  treated with by a chiropractor. He has had been on some long-term narcotics for his back pain but was generally doing well until about 3 months ago when he began having severe recurrent left buttock and leg pain. He was found to have a recurrent large L5-S1 herniated disc. He tried blocks which haven't helped. He has not had physical therapy but he's been to the chiropractor. He has not been on gabapentin. He's continue to work on these had trouble doing it. We discussed the nature of his recurrent disc herniation. Is quite large. I don't think it's likely to get better at this point and I think an open revision left L5-S1 microdiscectomy would be helpful. I don't think anything needs to be done about the recurrence on the right at L4-L5. Right now he doesn't have radiating leg pain on the right in its just upper buttock pain and some low back pain he knows that there is a possibility he might need to be fused in the future. He is not sure a month provider list with his insurance. I think he needs to have the surgery but if I can't do it, we'll send him to Dr. Britni Horn.  I'll give him some gabapentin to tide him over in terms of his radiating leg pain. He has no motor deficits and mild mechanical signs. No bowel or bladder incontinence.        Review of Systems   Constitutional: Positive for fatigue.   HENT: Positive for congestion and sinus pressure.   Gastrointestinal: Positive for abdominal pain. Negative for bowel incontinence.   Genitourinary: Negative for bladder incontinence.   Musculoskeletal: Positive for arthralgias, back pain and neck pain.   Allergic/Immunologic: Positive for environmental allergies.   Neurological: Positive for numbness. Negative for tingling and weakness.   Psychiatric/Behavioral: Positive for agitation and dysphoric mood. The patient is nervous/anxious.   All other systems reviewed and are negative.        Objective     Physical Exam   Constitutional: He is oriented to person,  place, and time. He appears well-developed and well-nourished.   HENT:   Head: Normocephalic and atraumatic.   Eyes: Conjunctivae and EOM are normal. Pupils are equal, round, and reactive to light.   Fundoscopic exam:  The right eye shows no papilledema. The right eye shows venous pulsations.   The left eye shows no papilledema. The left eye shows venous pulsations.   Neck: Carotid bruit is not present.   Neurological: He is oriented to person, place, and time. He has a normal Finger-Nose-Finger Test and a normal Heel to Shin Test. Gait normal.   Reflex Scores:  Tricep reflexes are 2+ on the right side and 2+ on the left side.  Bicep reflexes are 2+ on the right side and 2+ on the left side.  Brachioradialis reflexes are 2+ on the right side and 2+ on the left side.  Patellar reflexes are 2+ on the right side and 2+ on the left side.  Achilles reflexes are 2+ on the right side and 2+ on the left side.  Psychiatric: His speech is normal.      Neurologic Exam      Mental Status   Oriented to person, place, and time.   Registration of memory: Good recent and remote memory.   Attention: normal. Concentration: normal.   Speech: speech is normal   Level of consciousness: alert  Knowledge: consistent with education.      Cranial Nerves      CN II   Visual fields full to confrontation.   Visual acuity: normal     CN III, IV, VI   Pupils are equal, round, and reactive to light.  Extraocular motions are normal.      CN V   Facial sensation intact.   Right corneal reflex: normal  Left corneal reflex: normal     CN VII   Facial expression full, symmetric.   Right facial weakness: none  Left facial weakness: none     CN VIII   Hearing: intact     CN IX, X   Palate: symmetric     CN XI   Right sternocleidomastoid strength: normal  Left sternocleidomastoid strength: normal     CN XII   Tongue: not atrophic  Tongue deviation: none     Motor Exam   Muscle bulk: normal  Right arm tone: normal  Left arm tone: normal  Right leg tone:  normal  Left leg tone: normal     Strength   Strength 5/5 except as noted.      Sensory Exam   Light touch normal.      Gait, Coordination, and Reflexes      Gait  Gait: normal     Coordination   Finger to nose coordination: normal  Heel to shin coordination: normal     Reflexes   Right brachioradialis: 2+  Left brachioradialis: 2+  Right biceps: 2+  Left biceps: 2+  Right triceps: 2+  Left triceps: 2+  Right patellar: 2+  Left patellar: 2+  Right achilles: 2+  Left achilles: 2+  Right : 2+  Left : 2+        Assessment/Plan     Independent Review of Radiographic Studies:   I reviewed the lumbar MRI done recently on 11/30/16 which showed a very large left L5-S1 recurrent herniated disc and a smaller right L4 5 recurrent herniated disc. I agree with the report.        Medical Decision Making:    I described and recommended an open lumbar left L5/S1 microdiscectomy. The goal of surgery is relief of radiating leg pain with improvement of numbness, tingling, walking, and quality of life. This will not help or hinder low back pain. The risks include, but are not limited to, infection, hemorrhage on transfusion or reoperation, CSF leak requiring reoperation, incomplete relief of symptoms, potential need for additional surgeries in the future including a fusion, stroke, paralysis, coma, and death. The patient will find out if I am on his provider list, and if not, we will send him to Dr. Britni Horn. If so, we'll move forward with the surgery at [facility]. I've given him some gabapentin to help a bit with the sciatic pain.        Jeronimo was seen today for back pain.     Diagnoses and all orders for this visit:     Lumbar disc herniation with radiculopathy     Degeneration of lumbar intervertebral disc     Other orders  - gabapentin (NEURONTIN) 300 MG capsule; Take 1 capsule by mouth 2 (Two) Times a Day.     No Follow-up on file.                                Office Visit on 1/16/2017              Revision History               Detailed Report         No change in PE. Proceed as above.

## 2017-03-09 NOTE — PERIOPERATIVE NURSING NOTE
Dr Taylor here.  Consent revised per MD.  Initialed by pt and co-signed by Pt's healthcare surrogate, Charlette.   MRI disk given to OR staff.

## 2017-03-09 NOTE — PLAN OF CARE
Problem: Patient Care Overview (Adult)  Goal: Plan of Care Review  Outcome: Ongoing (interventions implemented as appropriate)    03/09/17 0702   Coping/Psychosocial Response Interventions   Plan Of Care Reviewed With patient   Patient Care Overview   Progress no change       Goal: Adult Individualization and Mutuality  Outcome: Ongoing (interventions implemented as appropriate)    03/09/17 0702   Individualization   Patient Specific Preferences none       Goal: Discharge Needs Assessment  Outcome: Ongoing (interventions implemented as appropriate)    03/09/17 0702   Discharge Needs Assessment   Concerns To Be Addressed no discharge needs identified;denies needs/concerns at this time   Equipment Needed After Discharge none   Self-Care   Equipment Currently Used at Home none         Problem: Perioperative Period (Adult)  Goal: Signs and Symptoms of Listed Potential Problems Will be Absent or Manageable (Perioperative Period)  Outcome: Ongoing (interventions implemented as appropriate)    03/09/17 0702   Perioperative Period   Problems Assessed (Perioperative Period) pain;infection;physiologic stress response   Problems Present (Perioperative Period) pain;physiologic stress response

## 2017-03-09 NOTE — ANESTHESIA PREPROCEDURE EVALUATION
Anesthesia Evaluation        Airway   Dental      Pulmonary    Cardiovascular         Neuro/Psych  (+) psychiatric history Depression,    GI/Hepatic/Renal/Endo      Musculoskeletal     Abdominal    Substance History      OB/GYN          Other   (+) arthritis                                 Anesthesia Plan    ASA 3     general     intravenous induction   Anesthetic plan and risks discussed with patient.

## 2017-03-09 NOTE — PLAN OF CARE
Problem: Patient Care Overview (Adult)  Goal: Plan of Care Review  Outcome: Ongoing (interventions implemented as appropriate)    03/09/17 8757   Coping/Psychosocial Response Interventions   Plan Of Care Reviewed With patient   Patient Care Overview   Progress improving   Outcome Evaluation   Outcome Summary/Follow up Plan vitals stable out of surgery. pain well controlled. pt ambulated in room and up to chair x 1.       Goal: Adult Individualization and Mutuality  Outcome: Ongoing (interventions implemented as appropriate)  Goal: Discharge Needs Assessment  Outcome: Ongoing (interventions implemented as appropriate)    Problem: Pain, Acute (Adult)  Goal: Identify Related Risk Factors and Signs and Symptoms  Outcome: Outcome(s) achieved Date Met:  03/09/17  Goal: Acceptable Pain Control/Comfort Level  Outcome: Ongoing (interventions implemented as appropriate)

## 2017-03-10 VITALS
RESPIRATION RATE: 18 BRPM | BODY MASS INDEX: 26.79 KG/M2 | SYSTOLIC BLOOD PRESSURE: 115 MMHG | OXYGEN SATURATION: 96 % | DIASTOLIC BLOOD PRESSURE: 69 MMHG | HEIGHT: 72 IN | WEIGHT: 197.8 LBS | TEMPERATURE: 97.7 F | HEART RATE: 91 BPM

## 2017-03-10 PROCEDURE — 97161 PT EVAL LOW COMPLEX 20 MIN: CPT

## 2017-03-10 PROCEDURE — 99024 POSTOP FOLLOW-UP VISIT: CPT | Performed by: PHYSICIAN ASSISTANT

## 2017-03-10 PROCEDURE — G8980 MOBILITY D/C STATUS: HCPCS

## 2017-03-10 PROCEDURE — G8979 MOBILITY GOAL STATUS: HCPCS

## 2017-03-10 PROCEDURE — 25010000002 VANCOMYCIN PER 500 MG: Performed by: NEUROLOGICAL SURGERY

## 2017-03-10 PROCEDURE — 94799 UNLISTED PULMONARY SVC/PX: CPT

## 2017-03-10 PROCEDURE — G8978 MOBILITY CURRENT STATUS: HCPCS

## 2017-03-10 PROCEDURE — G0378 HOSPITAL OBSERVATION PER HR: HCPCS

## 2017-03-10 PROCEDURE — 63710000001 DIPHENHYDRAMINE PER 50 MG: Performed by: NEUROLOGICAL SURGERY

## 2017-03-10 RX ORDER — HYDROCODONE BITARTRATE AND ACETAMINOPHEN 7.5; 325 MG/1; MG/1
2 TABLET ORAL EVERY 4 HOURS PRN
Refills: 0 | COMMUNITY
Start: 2017-03-10 | End: 2021-01-04 | Stop reason: HOSPADM

## 2017-03-10 RX ADMIN — PANTOPRAZOLE SODIUM 40 MG: 40 TABLET, DELAYED RELEASE ORAL at 05:23

## 2017-03-10 RX ADMIN — DIPHENHYDRAMINE HYDROCHLORIDE 25 MG: 25 CAPSULE ORAL at 10:16

## 2017-03-10 RX ADMIN — VANCOMYCIN HYDROCHLORIDE 1000 MG: 1 INJECTION, SOLUTION INTRAVENOUS at 02:09

## 2017-03-10 RX ADMIN — GABAPENTIN 300 MG: 300 CAPSULE ORAL at 08:01

## 2017-03-10 RX ADMIN — HYDROCODONE BITARTRATE AND ACETAMINOPHEN 2 TABLET: 7.5; 325 TABLET ORAL at 11:36

## 2017-03-10 RX ADMIN — CYCLOBENZAPRINE HYDROCHLORIDE 10 MG: 10 TABLET, FILM COATED ORAL at 08:01

## 2017-03-10 RX ADMIN — POTASSIUM CHLORIDE, DEXTROSE MONOHYDRATE AND SODIUM CHLORIDE 100 ML/HR: 150; 5; 450 INJECTION, SOLUTION INTRAVENOUS at 04:08

## 2017-03-10 RX ADMIN — Medication: at 10:00

## 2017-03-10 RX ADMIN — DULOXETINE HYDROCHLORIDE 60 MG: 60 CAPSULE, DELAYED RELEASE ORAL at 08:01

## 2017-03-10 NOTE — PLAN OF CARE
Problem: Patient Care Overview (Adult)  Goal: Plan of Care Review  Outcome: Outcome(s) achieved Date Met:  03/10/17  Goal: Adult Individualization and Mutuality  Outcome: Outcome(s) achieved Date Met:  03/10/17  Goal: Discharge Needs Assessment  Outcome: Outcome(s) achieved Date Met:  03/10/17

## 2017-03-10 NOTE — PLAN OF CARE
Problem: Patient Care Overview (Adult)  Goal: Plan of Care Review  Outcome: Ongoing (interventions implemented as appropriate)    03/09/17 1930 03/10/17 0322   Coping/Psychosocial Response Interventions   Plan Of Care Reviewed With patient --    Patient Care Overview   Progress --  progress toward functional goals as expected   Outcome Evaluation   Outcome Summary/Follow up Plan --  amb in g[place]        Goal: Discharge Needs Assessment  Outcome: Ongoing (interventions implemented as appropriate)    03/10/17 0322   Discharge Needs Assessment   Concerns To Be Addressed denies needs/concerns at this time   Readmission Within The Last 30 Days no previous admission in last 30 days   Equipment Needed After Discharge none   Discharge Disposition still a patient   Self-Care   Equipment Currently Used at Home none   Current Health   Anticipated Changes Related to Illness none   Living Environment   Transportation Available car;family or friend will provide         Problem: Pain, Acute (Adult)  Intervention: Monitor/Manage Analgesia    03/09/17 1930 03/10/17 0211 03/10/17 0322   Manage Acute Burn Pain   Bowel Intervention --  --  ambulation promoted   Pain Management Interventions --  medicated;relaxation techniques promoted --    Safety Interventions   Medication Review/Management medications reviewed --  --        Intervention: Mutually Develop/Implement Acute Pain Management Plan    03/10/17 0211 03/10/17 0322   Manage Acute Burn Pain   Pain Management Interventions medicated;relaxation techniques promoted --    Cognitive Interventions   Sensory Stimulation Regulation --  care clustered;quiet environment promoted       Intervention: Support/Optimize Psychosocial Response to Acute Pain    03/09/17 1930 03/10/17 0322   Coping Strategies   Trust Relationship/Rapport care explained --    Family/Support System Care --  self-care encouraged   Supportive Measures active listening utilized;verbalization of feelings encouraged --           Goal: Acceptable Pain Control/Comfort Level  Outcome: Ongoing (interventions implemented as appropriate)    03/10/17 0322   Pain, Acute (Adult)   Acceptable Pain Control/Comfort Level making progress toward outcome

## 2017-03-10 NOTE — DISCHARGE SUMMARY
Date of admission: March 9, 2017    Date of discharge: March 10, 2017    Hospital course: Mr. Dai underwent an uncomplicated open left L5-S1 and right L4-L5 revision discectomy yesterday with Dr. Taylor.  He tolerated the surgery well and is afebrile with normal vital signs.  He is ambulating and voiding without difficulty.  He no longer has any significant radicular pain.  His wound is healing well without signs of infection.  He will be discharged home today and will follow up 2 weeks from surgery as scheduled.  He understands that he must not drive, lift over 5 pounds, bend or twist or sit for any prolonged period of time.  He was encouraged to ambulate as much as possible.  He may shower daily and will call with any fever or chills or incisional redness swelling or drainage.  He will clean the wound with peroxide 3 times a day.  He normally is followed by Dr. Marley with pain management but understands that we will prescribe his Norco 7.5 325 in the immediate postoperative period only.

## 2017-03-10 NOTE — THERAPY DISCHARGE NOTE
Acute Care - Physical Therapy Initial Eval/Discharge  [facility]     Patient Name: Jeronimo Dai  : 1965  MRN: [id number]  Today's Date: 3/10/2017   Onset of Illness/Injury or Date of Surgery Date: 17     Referring Physician: Brandon      Admit Date: 3/9/2017    Visit Dx:  No diagnosis found.  Patient Active Problem List   Diagnosis   • Degeneration of lumbar intervertebral disc   • Lumbar disc herniation with radiculopathy     Past Medical History   Diagnosis Date   • Crohn's disease    • DDD (degenerative disc disease), lumbosacral    • Depression with anxiety      Past Surgical History   Procedure Laterality Date   • Appendectomy     • Hemicolectomy Bilateral      X2   • Cholecystectomy     • Lumbar disc surgery            PT ASSESSMENT (last 72 hours)      PT Evaluation       03/10/17 0854 03/10/17 0322    Rehab Evaluation    Document Type evaluation;discharge summary  -EE     Subjective Information agree to therapy;complains of;pain  -EE     Patient Effort, Rehab Treatment good  -EE     Symptoms Noted During/After Treatment none  -EE     General Information    Onset of Illness/Injury or Date of Surgery Date 17  -EE     Referring Physician Brandon  -EE     General Observations Adult male, sitting up in chair in no acute distress.  -EE     Pertinent History Of Current Problem s/p lumbar laminectomy and discectomy  -EE     Precautions/Limitations fall precautions;spinal precautions  -EE     Prior Level of Function independent:;all household mobility;community mobility  -EE     Equipment Currently Used at Home none  -EE none  -DH    Plans/Goals Discussed With patient;agreed upon  -EE     Barriers to Rehab none identified  -EE     Living Environment    Lives With alone   going to stay at friend's house upon DC  -EE     Transportation Available  car;family or friend will provide  -DH    Clinical Impression    Patient/Family Goals Statement Go home  -EE     Criteria for Skilled  Therapeutic Interventions Met no problems identified which require skilled intervention  -EE     Pain Assessment    Pain Assessment 0-10  -EE     Pain Score 4  -EE     Pain Type Surgical pain  -EE     Pain Location Back  -EE     Pain Orientation Lower  -EE     Pain Intervention(s) Repositioned;Ambulation/increased activity  -EE     Response to Interventions tolerated  -EE     Cognitive Assessment/Intervention    Current Cognitive/Communication Assessment functional  -EE     Orientation Status oriented x 4  -EE     Follows Commands/Answers Questions 100% of the time  -EE     Personal Safety WNL/WFL  -EE     Personal Safety Interventions fall prevention program maintained;gait belt;nonskid shoes/slippers when out of bed;supervised activity  -EE     ROM (Range of Motion)    General ROM no range of motion deficits identified  -EE     MMT (Manual Muscle Testing)    General MMT Assessment no strength deficits identified  -EE     General MMT Assessment Detail B LEs grossly WFL  -EE     Bed Mobility, Assessment/Treatment    Bed Mob, Supine to Sit, [place] not tested  -EE     Bed Mob, Sit to Supine, [place] not tested  -EE     Transfer Assessment/Treatment    Transfers, Sit-Stand [place] conditional independence  -EE     Transfers, Stand-Sit [place] conditional independence  -EE     Gait Assessment/Treatment    Gait, [place] Level conditional independence  -EE     Gait, Assistive Device other (see comments)   pushing IV pole  -EE     Gait, Distance (Feet) 400  -EE     Gait, Gait Deviations ever decreased  -EE     Motor Skills/Interventions    Additional Documentation Balance Skills Training (Group)  -EE     Balance Skills Training    Sitting-Level of Assistance Independent  -EE     Standing-Level of Assistance Independent  -EE     Gait Balance-Level of Assistance Independent  -EE     Positioning and Restraints    Pre-Treatment Position sitting in chair/recliner  -EE     Post Treatment Position  chair  -EE     In Chair sitting;call light within reach;encouraged to call for assist  -EE       03/09/17 1600 03/09/17 1447    General Information    Equipment Currently Used at Home  none  -DR    Living Environment    Lives With alone  - alone  -DR    Living Arrangements apartment  -     Home Accessibility stairs to enter home  -     Number of Stairs to Enter Home 7  -     Stair Railings at Home outside, present at both sides  -     Type of Financial/Environmental Concern none  -     Transportation Available car;family or friend will provide  - car;family or friend will provide  -DR      03/09/17 0702 03/09/17 0655    General Information    Equipment Currently Used at Home none  -TW none  -TW    Living Environment    Lives With  alone  -TW    Living Arrangements  apartment  -TW    Home Accessibility  stairs to enter home;no concerns;bed and bath on same level  -TW    Number of Stairs to Enter Home  8  -TW    Transportation Available  car  -TW      User Key  (r) = Recorded By, (t) = Taken By, (c) = Cosigned By    Initials Name Provider Type    DHARA Anegles PT Physical Therapist     Yael Pruett, RN Registered Nurse     Simona Funk, RN Registered Nurse     Polly Dai RN Registered Nurse    DR Bee Medeiros, RN Case Manager          Physical Therapy Education     Title: PT OT SLP Therapies (Resolved)     Topic: Physical Therapy (Resolved)     Point: Mobility training (Resolved)    Learning Progress Summary    Learner Readiness Method Response Comment Documented by Status   Patient Acceptance E RENE COTTON 03/10/17 0911 Done               Point: Body mechanics (Resolved)    Learning Progress Summary    Learner Readiness Method Response Comment Documented by Status   Patient Acceptance E RENE COTTON 03/10/17 0911 Done               Point: Precautions (Resolved)    Learning Progress Summary    Learner Readiness Method Response Comment Documented by Status   Patient Acceptance E  RENE COTTON  EE 03/10/17 0911 Done                      User Key     Initials Effective Dates Name Provider Type Discipline    EE 12/01/15 -  Ida Angeles PT Physical Therapist PT                PT Recommendation and Plan  Anticipated Discharge Disposition: home with assist  PT Frequency: evaluation only  Plan of Care Review  Plan Of Care Reviewed With: patient  Outcome Summary/Follow up Plan: Pt s/p lumbar laminectomy and discectomy presents with mild post op pain. Pt demonstrates no deficits in strength, balance, or gait.; ambulating in hallway independently. No further skilled PT warranted at this time.               Outcome Measures       03/10/17 0900          How much help from another person do you currently need...    Turning from your back to your side while in flat bed without using bedrails? 4  -EE      Moving from lying on back to sitting on the side of a flat bed without bedrails? 4  -EE      Moving to and from a bed to a chair (including a wheelchair)? 4  -EE      Standing up from a chair using your arms (e.g., wheelchair, bedside chair)? 4  -EE      Climbing 3-5 steps with a railing? 4  -EE      To walk in hospital room? 4  -EE      AM-PAC 6 Clicks Score 24  -EE      Functional Assessment    Outcome Measure Options AM-PAC 6 Clicks Basic Mobility (PT)  -EE        User Key  (r) = Recorded By, (t) = Taken By, (c) = Cosigned By    Initials Name Provider Type    EE Ida Angeles PT Physical Therapist           Time Calculation:         PT Charges       03/10/17 0913          Time Calculation    Start Time 0854  -EE      Stop Time 0906  -EE      Time Calculation (min) 12 min  -EE      PT Received On 03/10/17  -EE        User Key  (r) = Recorded By, (t) = Taken By, (c) = Cosigned By    Initials Name Provider Type    EE Ida Angeles PT Physical Therapist          Therapy Charges for Today     Code Description Service Date Service Provider Modifiers Qty    10260363586  PT MOBILITY CURRENT 3/10/2017 Ida Angeles PT  GP, [place] 1    [id number] HC PT MOBILITY PROJECTED 3/10/2017 Ida Angeles, PT GP, [place] 1    10925119903 HC PT MOBILITY DISCHARGE 3/10/2017 Ida Angeles, PT GP, [place] 1    [id number] HC PT EVAL LOW COMPLEXITY 1 3/10/2017 Ida Angeles, PT GP 1          PT G-Codes  Outcome Measure Options: AM-PAC 6 Clicks Basic Mobility (PT)  Functional Limitation: Mobility: Walking and moving around  Mobility: Walking and Moving Around Current Status (): 0 percent impaired, limited or restricted  Mobility: Walking and Moving Around Goal Status (): 0 percent impaired, limited or restricted  Mobility: Walking and Moving Around Discharge Status (): 0 percent impaired, limited or restricted    PT Discharge Summary  Anticipated Discharge Disposition: home with assist  Reason for Discharge: Independent    Ida Angeles, PT  3/10/2017

## 2017-03-10 NOTE — PLAN OF CARE
Problem: Patient Care Overview (Adult)  Goal: Plan of Care Review    03/10/17 0911   Coping/Psychosocial Response Interventions   Plan Of Care Reviewed With patient   Outcome Evaluation   Outcome Summary/Follow up Plan Pt s/p lumbar laminectomy and discectomy presents with mild post op pain. Pt demonstrates no deficits in strength, balance, or gait.; ambulating in hallway independently. No further skilled PT warranted at this time.

## 2017-03-10 NOTE — PROGRESS NOTES
Continued Stay Note  [facility]     Patient Name: Jeronimo Dai  MRN: [id number]  Today's Date: 3/10/2017    Admit Date: 3/9/2017          Discharge Plan       03/10/17 1509    Case Management/Social Work Plan    Additional Comments Patient discharged to friend's home today.  No needs.    Final Note    Final Note Home with assist of friend              Discharge Codes       03/10/17 1510    Discharge Codes    Discharge Codes 01  Discharge to home        Expected Discharge Date and Time     Expected Discharge Date Expected Discharge Time    Mar 10, 2017             Bee Medeiros RN

## 2017-03-17 ENCOUNTER — TELEPHONE (OUTPATIENT)
Dept: NEUROSURGERY | Facility: CLINIC | Age: 52
End: 2017-03-17

## 2017-03-17 NOTE — TELEPHONE ENCOUNTER
I attempted to contact Mr. Dai for a post op check.  There was no answer and I didn't leave a message.

## 2017-03-23 ENCOUNTER — OFFICE VISIT (OUTPATIENT)
Dept: NEUROSURGERY | Facility: CLINIC | Age: 52
End: 2017-03-23

## 2017-03-23 VITALS
HEIGHT: 72 IN | WEIGHT: 197 LBS | BODY MASS INDEX: 26.68 KG/M2 | HEART RATE: 94 BPM | DIASTOLIC BLOOD PRESSURE: 87 MMHG | SYSTOLIC BLOOD PRESSURE: 134 MMHG

## 2017-03-23 DIAGNOSIS — Z09 SURGERY FOLLOW-UP EXAMINATION: Primary | ICD-10-CM

## 2017-03-23 PROBLEM — M51.16 LUMBAR DISC HERNIATION WITH RADICULOPATHY: Status: RESOLVED | Noted: 2017-01-16 | Resolved: 2017-03-23

## 2017-03-23 PROCEDURE — 99024 POSTOP FOLLOW-UP VISIT: CPT | Performed by: PHYSICIAN ASSISTANT

## 2017-03-23 RX ORDER — HYDROCODONE BITARTRATE AND ACETAMINOPHEN 5; 325 MG/1; MG/1
TABLET ORAL
Refills: 0 | COMMUNITY
Start: 2017-03-10 | End: 2019-05-16

## 2017-03-23 NOTE — PROGRESS NOTES
Subjective   Patient ID: Jeronimo Dai is a 51 y.o. male is here today for follow-up.  He is 2 wks out from an open left L5-S1 discectomy and L4-5 revision discectomy by Dr. Taylor 3/9/17.  He is doing well and walking daily.  He denies any incisional issues. He takes Norco 7.5/325 QID from pain management Dr. Marley.      History of Present Illness    The following portions of the patient's history were reviewed and updated as appropriate: allergies, current medications, past family history, past medical history, past social history, past surgical history and problem list.    Review of Systems   Musculoskeletal: Positive for back pain (tenderness ).   Neurological: Negative for numbness.   All other systems reviewed and are negative.      Objective   Physical Exam  Neurologic Exam    Assessment/Plan   Independent Review of Radiographic Studies:      Medical Decision Making:    Mr. Dai is 2 weeks out from an open revision left L5-S1 discectomy with right L4-L5 discectomy.  He is doing very well and no longer has any radicular pain at all.  He is using his usual pain medication from Dr. Marley but nothing additional.  His wound has healed well without signs of infection.  He will begin physical therapy and stay off work.  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  We did discuss his restrictions at length.  He will follow-up in 4 weeks and we might be able to release him to work with restrictions at that time.  Jeronimo was seen today for post-op.    Diagnoses and all orders for this visit:    Surgery follow-up examination  -     Ambulatory Referral to Physical Therapy Evaluate and treat (2-3 times per week for 6wks)    Return in about 4 weeks (around 4/20/2017).

## 2017-04-20 ENCOUNTER — OFFICE VISIT (OUTPATIENT)
Dept: NEUROSURGERY | Facility: CLINIC | Age: 52
End: 2017-04-20

## 2017-04-20 VITALS
DIASTOLIC BLOOD PRESSURE: 99 MMHG | SYSTOLIC BLOOD PRESSURE: 141 MMHG | HEIGHT: 72 IN | HEART RATE: 107 BPM | BODY MASS INDEX: 26.68 KG/M2 | WEIGHT: 197 LBS

## 2017-04-20 DIAGNOSIS — Z09 SURGERY FOLLOW-UP EXAMINATION: Primary | ICD-10-CM

## 2017-04-20 PROCEDURE — 99024 POSTOP FOLLOW-UP VISIT: CPT | Performed by: PHYSICIAN ASSISTANT

## 2017-04-20 NOTE — PROGRESS NOTES
Subjective   Patient ID: Jeronimo Dai is a 51 y.o. male is here today for follow-up.  He is 6 wks out from an open revision left L5-S1 discectomy with right L4-L5 discectomy by Dr. Taylor 3/9/17. He has not started PT.  He walks 1-2 miles daily. Mr. Dai takes Gabapentin 300 mg BID, Cyclobenzaprine 10 mg and Hydrocodone 7.5/325 QID/PRN for pain.      History of Present Illness    The following portions of the patient's history were reviewed and updated as appropriate: allergies, current medications, past family history, past medical history, past social history, past surgical history and problem list.    Review of Systems   All other systems reviewed and are negative.      Objective   Physical Exam  Neurologic Exam    Assessment/Plan   Independent Review of Radiographic Studies:      Medical Decision Making:    Mr. Dai is 6 weeks out from an open revision left L5-S1 discectomy with right L4-L5 discectomy.  He did not end up going to physical therapy.  He is doing extremely well and other than an occasional episode of left buttock pain and no longer has any significant back or leg pain.  He feels ready to return to work and may return to work with restrictions of no lifting over 20 pounds.  We did discuss how to slowly lessen his restrictions over the next 6-8 weeks.  He will follow-up in one month and we can hopefully release him to regular duty at that time.  Jeronimo was seen today for post-op.    Diagnoses and all orders for this visit:    Surgery follow-up examination    Return in about 4 weeks (around 5/18/2017).

## 2017-05-05 RX ORDER — GABAPENTIN 300 MG/1
CAPSULE ORAL
Qty: 60 CAPSULE | Refills: 0 | Status: SHIPPED | OUTPATIENT
Start: 2017-05-05 | End: 2017-05-17 | Stop reason: SDUPTHER

## 2017-05-18 RX ORDER — GABAPENTIN 300 MG/1
CAPSULE ORAL
Qty: 60 CAPSULE | Refills: 0 | Status: SHIPPED | OUTPATIENT
Start: 2017-05-18 | End: 2017-06-14 | Stop reason: SDUPTHER

## 2017-06-14 RX ORDER — GABAPENTIN 300 MG/1
CAPSULE ORAL
Qty: 60 CAPSULE | Refills: 0 | Status: SHIPPED | OUTPATIENT
Start: 2017-06-14 | End: 2017-07-17 | Stop reason: SDUPTHER

## 2017-07-15 RX ORDER — GABAPENTIN 300 MG/1
CAPSULE ORAL
Qty: 60 CAPSULE | Refills: 0 | Status: CANCELLED | OUTPATIENT
Start: 2017-07-15

## 2017-07-17 RX ORDER — GABAPENTIN 300 MG/1
CAPSULE ORAL
Qty: 180 CAPSULE | Refills: 1
Start: 2017-07-17 | End: 2017-10-11 | Stop reason: SDUPTHER

## 2017-07-18 RX ORDER — GABAPENTIN 300 MG/1
CAPSULE ORAL
Qty: 180 CAPSULE | Refills: 1
Start: 2017-07-18 | End: 2017-07-19 | Stop reason: SDUPTHER

## 2017-07-19 ENCOUNTER — OFFICE VISIT (OUTPATIENT)
Dept: NEUROSURGERY | Facility: CLINIC | Age: 52
End: 2017-07-19

## 2017-07-19 VITALS
HEART RATE: 100 BPM | WEIGHT: 190.2 LBS | DIASTOLIC BLOOD PRESSURE: 88 MMHG | HEIGHT: 72 IN | BODY MASS INDEX: 25.76 KG/M2 | SYSTOLIC BLOOD PRESSURE: 127 MMHG

## 2017-07-19 DIAGNOSIS — M51.36 DEGENERATION OF LUMBAR INTERVERTEBRAL DISC: Primary | ICD-10-CM

## 2017-07-19 PROBLEM — Z09 SURGERY FOLLOW-UP EXAMINATION: Status: RESOLVED | Noted: 2017-03-23 | Resolved: 2017-07-19

## 2017-07-19 PROCEDURE — 99212 OFFICE O/P EST SF 10 MIN: CPT | Performed by: PHYSICIAN ASSISTANT

## 2017-07-19 NOTE — PROGRESS NOTES
Subjective   Patient ID: Jeronimo Dai is a 51 y.o. male is here today for follow-up.  He is 4 months out from an open revision left L5-S1 discectomy with right L4-L5 discectomy by Dr. Taylor 3/9/17. 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  Mr. Dai takes Cyclobenzaprine 10 mg PRN, Gabapentin 300 mg BID and Hydrocodone 5/325 PRN for pain.     Back Pain   This is a recurrent problem. The pain is at a severity of 2/10. Associated symptoms include numbness. Pertinent negatives include no bladder incontinence, bowel incontinence, fever, leg pain or weakness.       The following portions of the patient's history were reviewed and updated as appropriate: allergies, current medications, past family history, past medical history, past social history, past surgical history and problem list.    Review of Systems   Constitutional: Negative for fever.   Gastrointestinal: Negative for bowel incontinence.   Genitourinary: Negative for bladder incontinence and difficulty urinating.   Musculoskeletal: Positive for back pain (soreness/ left buttock pain ).   Neurological: Positive for numbness. Negative for weakness.   All other systems reviewed and are negative.      Objective   Physical Exam   Constitutional: He is oriented to person, place, and time. He appears well-developed and well-nourished.   HENT:   Head: Normocephalic and atraumatic.   Right Ear: External ear normal.   Left Ear: External ear normal.   Eyes: Conjunctivae and EOM are normal. Pupils are equal, round, and reactive to light. Right eye exhibits no discharge. Left eye exhibits no discharge.   Neck: Normal range of motion. Neck supple. No tracheal deviation present.   Pulmonary/Chest: Effort normal. No stridor. No respiratory distress.   Musculoskeletal: Normal range of motion. He exhibits no edema, tenderness or deformity.   Neurological: He is alert  and oriented to person, place, and time. He has normal strength and normal reflexes. He displays no atrophy, no tremor and normal reflexes. No cranial nerve deficit or sensory deficit. He exhibits normal muscle tone. He displays a negative Romberg sign. He displays no seizure activity. Coordination and gait normal.   No long tract signs   Skin: Skin is warm and dry.   Psychiatric: He has a normal mood and affect. His behavior is normal. Judgment and thought content normal.   Nursing note and vitals reviewed.    Neurologic Exam     Mental Status   Oriented to person, place, and time.     Cranial Nerves     CN III, IV, VI   Pupils are equal, round, and reactive to light.  Extraocular motions are normal.     Motor Exam     Strength   Strength 5/5 throughout.       Assessment/Plan   Independent Review of Radiographic Studies:      Medical Decision Making:    Mr. Dai is now 4 months out from an open revision right L4-L5 and left L5-S1 discectomy.  He is doing well and no longer has any radicular pain.  His low back pain has also resolved and he only reports occasional soreness after repetitive or prolonged activity.  He is back to work and tolerating that well and also back to all of his usual seizure activities and home activities.  He is aware of the risk of recurrence.  He understands that he can continue to increase his activity as tolerated.  He will call at any time with any recurrent pain or other questions or concerns.  Jeronimo was seen today for back pain.    Diagnoses and all orders for this visit:    Degeneration of lumbar intervertebral disc    Return if symptoms worsen or fail to improve.

## 2017-08-16 ENCOUNTER — OFFICE (OUTPATIENT)
Dept: URBAN - METROPOLITAN AREA OTHER 6 | Facility: OTHER | Age: 52
End: 2017-08-16
Payer: COMMERCIAL

## 2017-08-16 VITALS
WEIGHT: 194 LBS | HEART RATE: 88 BPM | HEIGHT: 72 IN | DIASTOLIC BLOOD PRESSURE: 80 MMHG | SYSTOLIC BLOOD PRESSURE: 127 MMHG

## 2017-08-16 DIAGNOSIS — E53.8 DEFICIENCY OF OTHER SPECIFIED B GROUP VITAMINS: ICD-10-CM

## 2017-08-16 DIAGNOSIS — K50.80 CROHN'S DISEASE OF BOTH SMALL AND LARGE INTESTINE WITHOUT CO: ICD-10-CM

## 2017-08-16 DIAGNOSIS — A04.9 BACTERIAL INTESTINAL INFECTION, UNSPECIFIED: ICD-10-CM

## 2017-08-16 PROCEDURE — 99212 OFFICE O/P EST SF 10 MIN: CPT | Performed by: INTERNAL MEDICINE

## 2017-08-16 RX ORDER — METRONIDAZOLE 500 MG/1
1500 TABLET, FILM COATED ORAL
Qty: 63 | Refills: 1 | Status: COMPLETED
Start: 2017-08-16 | End: 2019-04-09

## 2017-10-12 RX ORDER — GABAPENTIN 300 MG/1
CAPSULE ORAL
Qty: 180 CAPSULE | Refills: 0 | OUTPATIENT
Start: 2017-10-12 | End: 2017-10-16 | Stop reason: SDUPTHER

## 2017-10-16 RX ORDER — GABAPENTIN 300 MG/1
CAPSULE ORAL
Qty: 180 CAPSULE | Refills: 0 | OUTPATIENT
Start: 2017-10-16 | End: 2018-01-07 | Stop reason: SDUPTHER

## 2017-12-14 ENCOUNTER — AMBULATORY SURGICAL CENTER (OUTPATIENT)
Dept: URBAN - METROPOLITAN AREA SURGERY 20 | Facility: SURGERY | Age: 52
End: 2017-12-14
Payer: COMMERCIAL

## 2017-12-14 ENCOUNTER — OFFICE (OUTPATIENT)
Dept: URBAN - METROPOLITAN AREA CLINIC 64 | Facility: CLINIC | Age: 52
End: 2017-12-14
Payer: COMMERCIAL

## 2017-12-14 DIAGNOSIS — K50.90 CROHN'S DISEASE, UNSPECIFIED, WITHOUT COMPLICATIONS: ICD-10-CM

## 2017-12-14 DIAGNOSIS — K52.9 NONINFECTIVE GASTROENTERITIS AND COLITIS, UNSPECIFIED: ICD-10-CM

## 2017-12-14 PROCEDURE — 45380 COLONOSCOPY AND BIOPSY: CPT | Performed by: INTERNAL MEDICINE

## 2017-12-14 PROCEDURE — 88305 TISSUE EXAM BY PATHOLOGIST: CPT | Performed by: INTERNAL MEDICINE

## 2018-01-08 RX ORDER — GABAPENTIN 300 MG/1
CAPSULE ORAL
Qty: 180 CAPSULE | Refills: 0 | Status: SHIPPED | OUTPATIENT
Start: 2018-01-08

## 2018-01-08 NOTE — TELEPHONE ENCOUNTER
Refill has been called in to the [facility] on [facility]

## 2018-04-16 NOTE — TELEPHONE ENCOUNTER
Left a message for the patient to let him know that he needs an appt or he can get the Gabapentin refilled through his PCP.

## 2018-04-16 NOTE — TELEPHONE ENCOUNTER
Needs to see us since I'm still giving the medicine. If he has a PCP, he can ask that doctor. If not, give him enough for one month and have him see either me or s or l.

## 2018-04-17 RX ORDER — GABAPENTIN 300 MG/1
CAPSULE ORAL
Qty: 180 CAPSULE | Refills: 0 | OUTPATIENT
Start: 2018-04-17

## 2019-04-09 ENCOUNTER — OFFICE (OUTPATIENT)
Dept: URBAN - METROPOLITAN AREA CLINIC 66 | Facility: CLINIC | Age: 54
End: 2019-04-09

## 2019-04-09 VITALS
SYSTOLIC BLOOD PRESSURE: 113 MMHG | HEART RATE: 118 BPM | DIASTOLIC BLOOD PRESSURE: 79 MMHG | WEIGHT: 192 LBS | HEIGHT: 72 IN

## 2019-04-09 DIAGNOSIS — K50.80 CROHN'S DISEASE OF BOTH SMALL AND LARGE INTESTINE WITHOUT CO: ICD-10-CM

## 2019-04-09 DIAGNOSIS — Z83.71 FAMILY HISTORY OF COLONIC POLYPS: ICD-10-CM

## 2019-04-09 DIAGNOSIS — K52.9 NONINFECTIVE GASTROENTERITIS AND COLITIS, UNSPECIFIED: ICD-10-CM

## 2019-04-09 DIAGNOSIS — E55.9 VITAMIN D DEFICIENCY, UNSPECIFIED: ICD-10-CM

## 2019-04-09 PROCEDURE — 99214 OFFICE O/P EST MOD 30 MIN: CPT | Performed by: NURSE PRACTITIONER

## 2019-04-09 RX ORDER — PREDNISONE 10 MG/1
TABLET ORAL
Qty: 100 | Refills: 0 | Status: ACTIVE
Start: 2019-04-09

## 2019-05-06 ENCOUNTER — OFFICE (OUTPATIENT)
Dept: URBAN - METROPOLITAN AREA CLINIC 66 | Facility: CLINIC | Age: 54
End: 2019-05-06

## 2019-05-06 ENCOUNTER — TRANSCRIBE ORDERS (OUTPATIENT)
Dept: ADMINISTRATIVE | Facility: HOSPITAL | Age: 54
End: 2019-05-06

## 2019-05-06 VITALS
WEIGHT: 196 LBS | HEART RATE: 96 BPM | SYSTOLIC BLOOD PRESSURE: 110 MMHG | DIASTOLIC BLOOD PRESSURE: 72 MMHG | HEIGHT: 72 IN

## 2019-05-06 DIAGNOSIS — Z83.71 FAMILY HISTORY OF COLONIC POLYPS: ICD-10-CM

## 2019-05-06 DIAGNOSIS — Z79.899 OTHER LONG TERM (CURRENT) DRUG THERAPY: ICD-10-CM

## 2019-05-06 DIAGNOSIS — K50.80 CROHN'S DISEASE OF BOTH SMALL AND LARGE INTESTINE WITHOUT CO: ICD-10-CM

## 2019-05-06 DIAGNOSIS — M85.80 OTHER SPECIFIED DISORDERS OF BONE DENSITY AND STRUCTURE, UNS: ICD-10-CM

## 2019-05-06 DIAGNOSIS — K21.9 GASTRO-ESOPHAGEAL REFLUX DISEASE WITHOUT ESOPHAGITIS: ICD-10-CM

## 2019-05-06 DIAGNOSIS — D51.9 VITAMIN B12 DEFICIENCY ANEMIA, UNSPECIFIED: ICD-10-CM

## 2019-05-06 DIAGNOSIS — Z79.899 LONG-TERM USE OF HIGH-RISK MEDICATION: Primary | ICD-10-CM

## 2019-05-06 PROCEDURE — 99213 OFFICE O/P EST LOW 20 MIN: CPT | Performed by: NURSE PRACTITIONER

## 2019-05-14 ENCOUNTER — LAB (OUTPATIENT)
Dept: LAB | Facility: HOSPITAL | Age: 54
End: 2019-05-14

## 2019-05-14 ENCOUNTER — HOSPITAL ENCOUNTER (OUTPATIENT)
Dept: BONE DENSITY | Facility: HOSPITAL | Age: 54
Discharge: HOME | End: 2019-05-14
Admitting: NURSE PRACTITIONER

## 2019-05-14 ENCOUNTER — TRANSCRIBE ORDERS (OUTPATIENT)
Dept: LAB | Facility: HOSPITAL | Age: 54
End: 2019-05-14

## 2019-05-14 DIAGNOSIS — Z79.899 LONG-TERM USE OF HIGH-RISK MEDICATION: ICD-10-CM

## 2019-05-14 DIAGNOSIS — K50.819 CROHN'S DISEASE OF SMALL AND LARGE INTESTINES WITH COMPLICATION (HCC): ICD-10-CM

## 2019-05-14 DIAGNOSIS — E53.8 B12 DEFICIENCY: ICD-10-CM

## 2019-05-14 DIAGNOSIS — K21.9 GASTROESOPHAGEAL REFLUX DISEASE, ESOPHAGITIS PRESENCE NOT SPECIFIED: ICD-10-CM

## 2019-05-14 DIAGNOSIS — Z79.899 HIGH RISK MEDICATION USE: ICD-10-CM

## 2019-05-14 DIAGNOSIS — K50.819 CROHN'S DISEASE OF SMALL AND LARGE INTESTINES WITH COMPLICATION (HCC): Primary | ICD-10-CM

## 2019-05-14 DIAGNOSIS — M85.80 OSTEOPATHIA HYPEROSTOTICA CONGENITA: ICD-10-CM

## 2019-05-14 PROCEDURE — 86480 TB TEST CELL IMMUN MEASURE: CPT

## 2019-05-14 PROCEDURE — 77080 DXA BONE DENSITY AXIAL: CPT

## 2019-05-14 PROCEDURE — 36415 COLL VENOUS BLD VENIPUNCTURE: CPT

## 2019-05-16 LAB
QUANTIFERON CRITERIA: NORMAL
QUANTIFERON MITOGEN VALUE: >10 IU/ML
QUANTIFERON NIL VALUE: 0.03 IU/ML
QUANTIFERON TB1 AG VALUE: 0.04 IU/ML
QUANTIFERON TB2 AG VALUE: 0.03 IU/ML
QUANTIFERON-TB GOLD PLUS: NEGATIVE

## 2019-07-01 ENCOUNTER — OFFICE (OUTPATIENT)
Dept: URBAN - METROPOLITAN AREA CLINIC 66 | Facility: CLINIC | Age: 54
End: 2019-07-01

## 2019-07-01 VITALS
HEIGHT: 72 IN | SYSTOLIC BLOOD PRESSURE: 112 MMHG | HEART RATE: 92 BPM | WEIGHT: 198 LBS | DIASTOLIC BLOOD PRESSURE: 78 MMHG

## 2019-07-01 DIAGNOSIS — Z91.89 OTHER SPECIFIED PERSONAL RISK FACTORS, NOT ELSEWHERE CLASSIF: ICD-10-CM

## 2019-07-01 DIAGNOSIS — K50.80 CROHN'S DISEASE OF BOTH SMALL AND LARGE INTESTINE WITHOUT CO: ICD-10-CM

## 2019-07-01 DIAGNOSIS — K21.9 GASTRO-ESOPHAGEAL REFLUX DISEASE WITHOUT ESOPHAGITIS: ICD-10-CM

## 2019-07-01 PROCEDURE — 99214 OFFICE O/P EST MOD 30 MIN: CPT | Performed by: NURSE PRACTITIONER

## 2019-07-01 RX ORDER — DICYCLOMINE HYDROCHLORIDE 20 MG/1
80 TABLET ORAL
Qty: 120 | Refills: 12 | Status: ACTIVE
Start: 2019-07-01

## 2020-06-18 ENCOUNTER — TELEHEALTH PROVIDED OTHER THAN IN PATIENT'S HOME (OUTPATIENT)
Dept: URBAN - METROPOLITAN AREA TELEHEALTH 5 | Facility: TELEHEALTH | Age: 55
End: 2020-06-18

## 2020-06-18 VITALS — HEIGHT: 72 IN

## 2020-06-18 DIAGNOSIS — K50.80 CROHN'S DISEASE OF BOTH SMALL AND LARGE INTESTINE WITHOUT CO: ICD-10-CM

## 2020-06-18 DIAGNOSIS — A09 INFECTIOUS GASTROENTERITIS AND COLITIS, UNSPECIFIED: ICD-10-CM

## 2020-06-18 DIAGNOSIS — K50.90 CROHN'S DISEASE, UNSPECIFIED, WITHOUT COMPLICATIONS: ICD-10-CM

## 2020-06-18 DIAGNOSIS — K21.9 GASTRO-ESOPHAGEAL REFLUX DISEASE WITHOUT ESOPHAGITIS: ICD-10-CM

## 2020-06-18 PROCEDURE — 99213 OFFICE O/P EST LOW 20 MIN: CPT | Performed by: INTERNAL MEDICINE

## 2020-12-28 ENCOUNTER — TRANSCRIBE ORDERS (OUTPATIENT)
Dept: ADMINISTRATIVE | Facility: HOSPITAL | Age: 55
End: 2020-12-28

## 2020-12-28 DIAGNOSIS — Z01.818 OTHER SPECIFIED PRE-OPERATIVE EXAMINATION: Primary | ICD-10-CM

## 2020-12-29 ENCOUNTER — LAB (OUTPATIENT)
Dept: LAB | Facility: HOSPITAL | Age: 55
End: 2020-12-29

## 2020-12-29 DIAGNOSIS — Z01.818 OTHER SPECIFIED PRE-OPERATIVE EXAMINATION: ICD-10-CM

## 2020-12-29 PROCEDURE — U0004 COV-19 TEST NON-CDC HGH THRU: HCPCS

## 2020-12-29 PROCEDURE — C9803 HOPD COVID-19 SPEC COLLECT: HCPCS

## 2020-12-30 LAB — SARS-COV-2 RNA RESP QL NAA+PROBE: NOT DETECTED

## 2020-12-30 RX ORDER — ZOLPIDEM TARTRATE 10 MG/1
10 TABLET ORAL NIGHTLY PRN
COMMUNITY
Start: 2020-12-04

## 2020-12-30 RX ORDER — DICYCLOMINE HCL 20 MG
20 TABLET ORAL EVERY 6 HOURS PRN
COMMUNITY

## 2020-12-30 RX ORDER — METOPROLOL SUCCINATE 50 MG/1
50 TABLET, EXTENDED RELEASE ORAL DAILY
COMMUNITY
Start: 2020-08-04 | End: 2021-08-04

## 2020-12-31 ENCOUNTER — ON CAMPUS - OUTPATIENT (OUTPATIENT)
Dept: URBAN - METROPOLITAN AREA HOSPITAL 114 | Facility: HOSPITAL | Age: 55
End: 2020-12-31

## 2020-12-31 ENCOUNTER — ANESTHESIA (OUTPATIENT)
Dept: GASTROENTEROLOGY | Facility: HOSPITAL | Age: 55
End: 2020-12-31

## 2020-12-31 ENCOUNTER — HOSPITAL ENCOUNTER (OUTPATIENT)
Facility: HOSPITAL | Age: 55
Setting detail: HOSPITAL OUTPATIENT SURGERY
Discharge: HOME | End: 2020-12-31
Attending: INTERNAL MEDICINE | Admitting: INTERNAL MEDICINE

## 2020-12-31 ENCOUNTER — ANESTHESIA EVENT (OUTPATIENT)
Dept: GASTROENTEROLOGY | Facility: HOSPITAL | Age: 55
End: 2020-12-31

## 2020-12-31 VITALS
HEIGHT: 72 IN | BODY MASS INDEX: 25.87 KG/M2 | RESPIRATION RATE: 16 BRPM | WEIGHT: 191 LBS | HEART RATE: 79 BPM | OXYGEN SATURATION: 98 % | TEMPERATURE: 98.3 F | SYSTOLIC BLOOD PRESSURE: 124 MMHG | DIASTOLIC BLOOD PRESSURE: 90 MMHG

## 2020-12-31 DIAGNOSIS — K63.3 ULCER OF INTESTINE: ICD-10-CM

## 2020-12-31 DIAGNOSIS — Z86.010 PERSONAL HISTORY OF COLONIC POLYPS: ICD-10-CM

## 2020-12-31 DIAGNOSIS — K50.80 CROHN'S DISEASE OF BOTH SMALL AND LARGE INTESTINE WITHOUT CO: ICD-10-CM

## 2020-12-31 DIAGNOSIS — Z86.010 HISTORY OF COLONIC POLYPS: ICD-10-CM

## 2020-12-31 PROCEDURE — 0DBG8ZX EXCISION OF LEFT LARGE INTESTINE, VIA NATURAL OR ARTIFICIAL OPENING ENDOSCOPIC, DIAGNOSTIC: ICD-10-PCS | Performed by: INTERNAL MEDICINE

## 2020-12-31 PROCEDURE — 25010000002 PROPOFOL 10 MG/ML EMULSION: Performed by: NURSE ANESTHETIST, CERTIFIED REGISTERED

## 2020-12-31 PROCEDURE — 0DBB8ZX EXCISION OF ILEUM, VIA NATURAL OR ARTIFICIAL OPENING ENDOSCOPIC, DIAGNOSTIC: ICD-10-PCS | Performed by: INTERNAL MEDICINE

## 2020-12-31 PROCEDURE — 45380 COLONOSCOPY AND BIOPSY: CPT | Mod: 33 | Performed by: INTERNAL MEDICINE

## 2020-12-31 PROCEDURE — 0DBF8ZX EXCISION OF RIGHT LARGE INTESTINE, VIA NATURAL OR ARTIFICIAL OPENING ENDOSCOPIC, DIAGNOSTIC: ICD-10-PCS | Performed by: INTERNAL MEDICINE

## 2020-12-31 PROCEDURE — 0DBN8ZX EXCISION OF SIGMOID COLON, VIA NATURAL OR ARTIFICIAL OPENING ENDOSCOPIC, DIAGNOSTIC: ICD-10-PCS | Performed by: INTERNAL MEDICINE

## 2020-12-31 PROCEDURE — 88305 TISSUE EXAM BY PATHOLOGIST: CPT | Performed by: INTERNAL MEDICINE

## 2020-12-31 RX ORDER — SODIUM CHLORIDE, SODIUM LACTATE, POTASSIUM CHLORIDE, CALCIUM CHLORIDE 600; 310; 30; 20 MG/100ML; MG/100ML; MG/100ML; MG/100ML
1000 INJECTION, SOLUTION INTRAVENOUS CONTINUOUS
Status: DISCONTINUED | OUTPATIENT
Start: 2020-12-31 | End: 2020-12-31 | Stop reason: HOSPADM

## 2020-12-31 RX ORDER — PROPOFOL 10 MG/ML
VIAL (ML) INTRAVENOUS CONTINUOUS PRN
Status: DISCONTINUED | OUTPATIENT
Start: 2020-12-31 | End: 2020-12-31 | Stop reason: SURG

## 2020-12-31 RX ORDER — LIDOCAINE HYDROCHLORIDE 20 MG/ML
INJECTION, SOLUTION INFILTRATION; PERINEURAL AS NEEDED
Status: DISCONTINUED | OUTPATIENT
Start: 2020-12-31 | End: 2020-12-31 | Stop reason: SURG

## 2020-12-31 RX ORDER — PROPOFOL 10 MG/ML
VIAL (ML) INTRAVENOUS AS NEEDED
Status: DISCONTINUED | OUTPATIENT
Start: 2020-12-31 | End: 2020-12-31 | Stop reason: SURG

## 2020-12-31 RX ADMIN — LIDOCAINE HYDROCHLORIDE 100 MG: 20 INJECTION, SOLUTION INFILTRATION; PERINEURAL at 09:58

## 2020-12-31 RX ADMIN — SODIUM CHLORIDE, POTASSIUM CHLORIDE, SODIUM LACTATE AND CALCIUM CHLORIDE 1000 ML: 600; 310; 30; 20 INJECTION, SOLUTION INTRAVENOUS at 09:38

## 2020-12-31 RX ADMIN — PROPOFOL 150 MG: 10 INJECTION, EMULSION INTRAVENOUS at 09:58

## 2020-12-31 RX ADMIN — PROPOFOL 160 MCG/KG/MIN: 10 INJECTION, EMULSION INTRAVENOUS at 09:58

## 2020-12-31 NOTE — ANESTHESIA POSTPROCEDURE EVALUATION
"Patient: Jeronimo Dai    Procedure Summary     Date: 12/31/20 Room / Location:  [facility] /  [facility]    Anesthesia Start: 0954 Anesthesia Stop: 1024    Procedure: COLONOSCOPY (N/A ) Diagnosis:     Surgeon: Sarthak Ruby MD Provider: Carlitos Lockett MD    Anesthesia Type: MAC ASA Status: 3          Anesthesia Type: MAC    Vitals  Vitals Value Taken Time   /90 12/31/20 1042   Temp     Pulse 79 12/31/20 1042   Resp 16 12/31/20 1042   SpO2 98 % 12/31/20 1042           Post Anesthesia Care and Evaluation    Patient location during evaluation: PACU  Patient participation: complete - patient participated  Level of consciousness: awake  Pain score: 0  Pain management: adequate  Airway patency: patent  Anesthetic complications: No anesthetic complications  PONV Status: none  Cardiovascular status: acceptable  Respiratory status: acceptable  Hydration status: acceptable    Comments: /90 (BP Location: Left arm, Patient Position: Lying)   Pulse 79   Temp 36.8 °C (98.3 °F) (Oral)   Resp 16   Ht 182.9 cm (72\")   Wt 86.6 kg (191 lb)   SpO2 98%   BMI 25.90 kg/m²       "

## 2020-12-31 NOTE — ANESTHESIA PREPROCEDURE EVALUATION
Anesthesia Evaluation     Patient summary reviewed and Nursing notes reviewed                Airway   Mallampati: I  TM distance: >3 FB  Neck ROM: full  No difficulty expected  Dental - normal exam     Pulmonary - negative pulmonary ROS and normal exam   Cardiovascular - normal exam    (+) hypertension,       Neuro/Psych  (+) psychiatric history Anxiety and Depression,     GI/Hepatic/Renal/Endo - negative ROS     Musculoskeletal     Abdominal  - normal exam    Bowel sounds: normal.   Substance History - negative use     OB/GYN negative ob/gyn ROS         Other   arthritis,                      Anesthesia Plan    ASA 3     MAC       Anesthetic plan, all risks, benefits, and alternatives have been provided, discussed and informed consent has been obtained with: patient.

## 2021-01-01 ENCOUNTER — HOSPITAL ENCOUNTER (INPATIENT)
Facility: HOSPITAL | Age: 56
LOS: 3 days | Discharge: HOME | End: 2021-01-04
Attending: EMERGENCY MEDICINE | Admitting: INTERNAL MEDICINE

## 2021-01-01 ENCOUNTER — APPOINTMENT (OUTPATIENT)
Dept: CT IMAGING | Facility: HOSPITAL | Age: 56
End: 2021-01-01

## 2021-01-01 DIAGNOSIS — K50.919 CROHN'S DISEASE WITH COMPLICATION, UNSPECIFIED GASTROINTESTINAL TRACT LOCATION (HCC): ICD-10-CM

## 2021-01-01 DIAGNOSIS — K56.609 SMALL BOWEL OBSTRUCTION (HCC): Primary | ICD-10-CM

## 2021-01-01 PROBLEM — I10 HTN (HYPERTENSION): Status: ACTIVE | Noted: 2021-01-01

## 2021-01-01 PROBLEM — Z87.19 HISTORY OF CROHN'S DISEASE: Status: ACTIVE | Noted: 2021-01-01

## 2021-01-01 LAB
ALBUMIN SERPL-MCNC: 4.3 G/DL (ref 3.5–5.2)
ALBUMIN/GLOB SERPL: 1.3 G/DL
ALP SERPL-CCNC: 75 U/L (ref 39–117)
ALT SERPL W P-5'-P-CCNC: 13 U/L (ref 1–41)
ANION GAP SERPL CALCULATED.3IONS-SCNC: 10.2 MMOL/L (ref 5–15)
AST SERPL-CCNC: 15 U/L (ref 1–40)
BACTERIA UR QL AUTO: ABNORMAL /HPF
BASOPHILS # BLD AUTO: 0.03 10*3/MM3 (ref 0–0.2)
BASOPHILS NFR BLD AUTO: 0.3 % (ref 0–1.5)
BILIRUB SERPL-MCNC: 0.9 MG/DL (ref 0–1.2)
BILIRUB UR QL STRIP: NEGATIVE
BUN SERPL-MCNC: 8 MG/DL (ref 6–20)
BUN/CREAT SERPL: 9.5 (ref 7–25)
CALCIUM SPEC-SCNC: 8.6 MG/DL (ref 8.6–10.5)
CHLORIDE SERPL-SCNC: 101 MMOL/L (ref 98–107)
CLARITY UR: ABNORMAL
CO2 SERPL-SCNC: 25.8 MMOL/L (ref 22–29)
COD CRY URNS QL: ABNORMAL /HPF
COLOR UR: ABNORMAL
CREAT SERPL-MCNC: 0.84 MG/DL (ref 0.76–1.27)
DEPRECATED RDW RBC AUTO: 41.9 FL (ref 37–54)
EOSINOPHIL # BLD AUTO: 0.02 10*3/MM3 (ref 0–0.4)
EOSINOPHIL NFR BLD AUTO: 0.2 % (ref 0.3–6.2)
ERYTHROCYTE [DISTWIDTH] IN BLOOD BY AUTOMATED COUNT: 13 % (ref 12.3–15.4)
GFR SERPL CREATININE-BSD FRML MDRD: 94 ML/MIN/1.73
GLOBULIN UR ELPH-MCNC: 3.3 GM/DL
GLUCOSE SERPL-MCNC: 155 MG/DL (ref 65–99)
GLUCOSE UR STRIP-MCNC: NEGATIVE MG/DL
HCT VFR BLD AUTO: 45.2 % (ref 37.5–51)
HGB BLD-MCNC: 15.4 G/DL (ref 13–17.7)
HGB UR QL STRIP.AUTO: NEGATIVE
HYALINE CASTS UR QL AUTO: ABNORMAL /LPF
IMM GRANULOCYTES # BLD AUTO: 0.04 10*3/MM3 (ref 0–0.05)
IMM GRANULOCYTES NFR BLD AUTO: 0.4 % (ref 0–0.5)
INR PPP: 1.07 (ref 0.9–1.1)
KETONES UR QL STRIP: ABNORMAL
LEUKOCYTE ESTERASE UR QL STRIP.AUTO: NEGATIVE
LIPASE SERPL-CCNC: 12 U/L (ref 13–60)
LYMPHOCYTES # BLD AUTO: 1.16 10*3/MM3 (ref 0.7–3.1)
LYMPHOCYTES NFR BLD AUTO: 11.2 % (ref 19.6–45.3)
MCH RBC QN AUTO: 30.6 PG (ref 26.6–33)
MCHC RBC AUTO-ENTMCNC: 34.1 G/DL (ref 31.5–35.7)
MCV RBC AUTO: 89.7 FL (ref 79–97)
MONOCYTES # BLD AUTO: 0.55 10*3/MM3 (ref 0.1–0.9)
MONOCYTES NFR BLD AUTO: 5.3 % (ref 5–12)
MUCOUS THREADS URNS QL MICRO: ABNORMAL /HPF
NEUTROPHILS NFR BLD AUTO: 8.56 10*3/MM3 (ref 1.7–7)
NEUTROPHILS NFR BLD AUTO: 82.6 % (ref 42.7–76)
NITRITE UR QL STRIP: NEGATIVE
NRBC BLD AUTO-RTO: 0 /100 WBC (ref 0–0.2)
PH UR STRIP.AUTO: 6 [PH] (ref 5–8)
PLATELET # BLD AUTO: 214 10*3/MM3 (ref 140–450)
PMV BLD AUTO: 11.3 FL (ref 6–12)
POTASSIUM SERPL-SCNC: 3.8 MMOL/L (ref 3.5–5.2)
PROT SERPL-MCNC: 7.6 G/DL (ref 6–8.5)
PROT UR QL STRIP: ABNORMAL
PROTHROMBIN TIME: 13.7 SECONDS (ref 11.7–14.2)
RBC # BLD AUTO: 5.04 10*6/MM3 (ref 4.14–5.8)
RBC # UR: ABNORMAL /HPF
REF LAB TEST METHOD: ABNORMAL
SODIUM SERPL-SCNC: 137 MMOL/L (ref 136–145)
SP GR UR STRIP: 1.01 (ref 1–1.03)
SQUAMOUS #/AREA URNS HPF: ABNORMAL /HPF
UROBILINOGEN UR QL STRIP: ABNORMAL
WBC # BLD AUTO: 10.36 10*3/MM3 (ref 3.4–10.8)
WBC UR QL AUTO: ABNORMAL /HPF

## 2021-01-01 PROCEDURE — 74177 CT ABD & PELVIS W/CONTRAST: CPT

## 2021-01-01 PROCEDURE — 85025 COMPLETE CBC W/AUTO DIFF WBC: CPT | Performed by: EMERGENCY MEDICINE

## 2021-01-01 PROCEDURE — 25010000002 ONDANSETRON PER 1 MG: Performed by: EMERGENCY MEDICINE

## 2021-01-01 PROCEDURE — 99284 EMERGENCY DEPT VISIT MOD MDM: CPT

## 2021-01-01 PROCEDURE — 80053 COMPREHEN METABOLIC PANEL: CPT | Performed by: EMERGENCY MEDICINE

## 2021-01-01 PROCEDURE — 99253 IP/OBS CNSLTJ NEW/EST LOW 45: CPT | Performed by: SURGERY

## 2021-01-01 PROCEDURE — 81001 URINALYSIS AUTO W/SCOPE: CPT | Performed by: EMERGENCY MEDICINE

## 2021-01-01 PROCEDURE — 85610 PROTHROMBIN TIME: CPT | Performed by: EMERGENCY MEDICINE

## 2021-01-01 PROCEDURE — 83690 ASSAY OF LIPASE: CPT | Performed by: EMERGENCY MEDICINE

## 2021-01-01 PROCEDURE — 25010000002 HYDROMORPHONE PER 4 MG: Performed by: INTERNAL MEDICINE

## 2021-01-01 PROCEDURE — 25010000002 HYDROMORPHONE PER 4 MG: Performed by: EMERGENCY MEDICINE

## 2021-01-01 PROCEDURE — 25010000002 ONDANSETRON PER 1 MG: Performed by: NURSE PRACTITIONER

## 2021-01-01 PROCEDURE — 25010000002 IOPAMIDOL 61 % SOLUTION: Performed by: EMERGENCY MEDICINE

## 2021-01-01 RX ORDER — ONDANSETRON 2 MG/ML
4 INJECTION INTRAMUSCULAR; INTRAVENOUS EVERY 6 HOURS PRN
Status: DISCONTINUED | OUTPATIENT
Start: 2021-01-01 | End: 2021-01-04 | Stop reason: HOSPADM

## 2021-01-01 RX ORDER — SODIUM CHLORIDE 0.9 % (FLUSH) 0.9 %
10 SYRINGE (ML) INJECTION AS NEEDED
Status: DISCONTINUED | OUTPATIENT
Start: 2021-01-01 | End: 2021-01-04 | Stop reason: HOSPADM

## 2021-01-01 RX ORDER — HYDROMORPHONE HYDROCHLORIDE 1 MG/ML
0.5 INJECTION, SOLUTION INTRAMUSCULAR; INTRAVENOUS; SUBCUTANEOUS
Status: DISCONTINUED | OUTPATIENT
Start: 2021-01-01 | End: 2021-01-04

## 2021-01-01 RX ORDER — ONDANSETRON 2 MG/ML
4 INJECTION INTRAMUSCULAR; INTRAVENOUS ONCE
Status: COMPLETED | OUTPATIENT
Start: 2021-01-01 | End: 2021-01-01

## 2021-01-01 RX ORDER — HYDROMORPHONE HYDROCHLORIDE 1 MG/ML
0.5 INJECTION, SOLUTION INTRAMUSCULAR; INTRAVENOUS; SUBCUTANEOUS ONCE
Status: COMPLETED | OUTPATIENT
Start: 2021-01-01 | End: 2021-01-01

## 2021-01-01 RX ORDER — NITROGLYCERIN 0.4 MG/1
0.4 TABLET SUBLINGUAL
Status: DISCONTINUED | OUTPATIENT
Start: 2021-01-01 | End: 2021-01-01

## 2021-01-01 RX ORDER — PANTOPRAZOLE SODIUM 40 MG/10ML
40 INJECTION, POWDER, LYOPHILIZED, FOR SOLUTION INTRAVENOUS
Status: DISCONTINUED | OUTPATIENT
Start: 2021-01-01 | End: 2021-01-04

## 2021-01-01 RX ORDER — SODIUM CHLORIDE 9 MG/ML
75 INJECTION, SOLUTION INTRAVENOUS CONTINUOUS
Status: DISCONTINUED | OUTPATIENT
Start: 2021-01-01 | End: 2021-01-04

## 2021-01-01 RX ORDER — SODIUM CHLORIDE 0.9 % (FLUSH) 0.9 %
10 SYRINGE (ML) INJECTION EVERY 12 HOURS SCHEDULED
Status: DISCONTINUED | OUTPATIENT
Start: 2021-01-01 | End: 2021-01-04 | Stop reason: HOSPADM

## 2021-01-01 RX ORDER — METOPROLOL SUCCINATE 50 MG/1
50 TABLET, EXTENDED RELEASE ORAL DAILY
Status: DISCONTINUED | OUTPATIENT
Start: 2021-01-01 | End: 2021-01-04 | Stop reason: HOSPADM

## 2021-01-01 RX ADMIN — HYDROMORPHONE HYDROCHLORIDE 0.5 MG: 1 INJECTION, SOLUTION INTRAMUSCULAR; INTRAVENOUS; SUBCUTANEOUS at 20:33

## 2021-01-01 RX ADMIN — SODIUM CHLORIDE 500 ML: 9 INJECTION, SOLUTION INTRAVENOUS at 08:34

## 2021-01-01 RX ADMIN — HYDROMORPHONE HYDROCHLORIDE 0.5 MG: 1 INJECTION, SOLUTION INTRAMUSCULAR; INTRAVENOUS; SUBCUTANEOUS at 15:27

## 2021-01-01 RX ADMIN — IOPAMIDOL 85 ML: 612 INJECTION, SOLUTION INTRAVENOUS at 10:01

## 2021-01-01 RX ADMIN — HYDROMORPHONE HYDROCHLORIDE 0.5 MG: 1 INJECTION, SOLUTION INTRAMUSCULAR; INTRAVENOUS; SUBCUTANEOUS at 22:37

## 2021-01-01 RX ADMIN — HYDROMORPHONE HYDROCHLORIDE 0.5 MG: 1 INJECTION, SOLUTION INTRAMUSCULAR; INTRAVENOUS; SUBCUTANEOUS at 10:29

## 2021-01-01 RX ADMIN — ONDANSETRON 4 MG: 2 INJECTION INTRAMUSCULAR; INTRAVENOUS at 12:45

## 2021-01-01 RX ADMIN — SODIUM CHLORIDE, PRESERVATIVE FREE 10 ML: 5 INJECTION INTRAVENOUS at 12:45

## 2021-01-01 RX ADMIN — PANTOPRAZOLE SODIUM 40 MG: 40 INJECTION, POWDER, FOR SOLUTION INTRAVENOUS at 20:29

## 2021-01-01 RX ADMIN — HYDROMORPHONE HYDROCHLORIDE 0.5 MG: 1 INJECTION, SOLUTION INTRAMUSCULAR; INTRAVENOUS; SUBCUTANEOUS at 18:19

## 2021-01-01 RX ADMIN — SODIUM CHLORIDE 125 ML/HR: 9 INJECTION, SOLUTION INTRAVENOUS at 12:45

## 2021-01-01 RX ADMIN — HYDROMORPHONE HYDROCHLORIDE 0.5 MG: 1 INJECTION, SOLUTION INTRAMUSCULAR; INTRAVENOUS; SUBCUTANEOUS at 08:34

## 2021-01-01 RX ADMIN — SODIUM CHLORIDE 125 ML/HR: 9 INJECTION, SOLUTION INTRAVENOUS at 20:33

## 2021-01-01 RX ADMIN — ONDANSETRON HYDROCHLORIDE 4 MG: 2 SOLUTION INTRAMUSCULAR; INTRAVENOUS at 08:34

## 2021-01-01 RX ADMIN — ONDANSETRON 4 MG: 2 INJECTION INTRAMUSCULAR; INTRAVENOUS at 18:41

## 2021-01-01 RX ADMIN — HYDROMORPHONE HYDROCHLORIDE 0.5 MG: 1 INJECTION, SOLUTION INTRAMUSCULAR; INTRAVENOUS; SUBCUTANEOUS at 12:52

## 2021-01-01 NOTE — H&P
Patient Name:  Jeronimo Dai  YOB: 1965  MRN:  [id number]  Admit Date:  1/1/2021  Patient Care Team:  Andria Wolfe APRN as PCP - General (Nurse Practitioner)  Skip Marley MD as Consulting Physician (Pain Medicine)  Drew Taylor MD as Surgeon (Neurosurgery)  Sarthak Ruby MD as Consulting Physician (Gastroenterology)      Subjective   History Present Illness     Chief Complaint   Patient presents with   • Abdominal Pain       Mr. Dai is a 55 y.o. male never smoker with a history of Crohn's disease and hypertension that presents to [facility] complaining of abdominal pain and vomiting which began yesterday mid afternoon after his routine colonoscopy procedure.  Patient reports having a history of Crohn's disease for which he has regular colonoscopies.  He is familiar with a usual gas pain after colonoscopy.  Yesterday the gas pain was more intense, did not ease up and worsened this morning when his pain became more sharp and more typical in nature of a Crohn's flare.  He vomited once this morning, there was no obvious blood.  He denies any rectal bleeding.  No BM since prep for colonoscopy.  No fever, chills or diaphoresis.  No shortness of breath or chest pain.    Prior to yesterday patient had been feeling well.  No known exposure to COVID-19 or any sick persons.    Reported medical history includes hypertension, Crohn's disease, multiple colonoscopies, appendectomy and right hemicolectomy.  He has been followed by Dr. Ruby for his Crohn's disease for many years.     Review of Systems   Constitutional: Negative for activity change, appetite change, chills, diaphoresis, fatigue, fever and unexpected weight change.   HENT: Negative for congestion and sore throat.    Eyes: Negative for discharge and visual disturbance.   Respiratory: Negative for cough, chest tightness and shortness of breath.    Cardiovascular: Negative for chest pain, palpitations and  leg swelling.   Gastrointestinal: Positive for abdominal distention, abdominal pain, nausea and rectal pain. Negative for anal bleeding and blood in stool.   Genitourinary: Negative for difficulty urinating and dysuria.   Musculoskeletal: Negative for arthralgias and myalgias.   Skin: Negative for rash and wound.   Neurological: Negative for dizziness, weakness, light-headedness and headaches.   Psychiatric/Behavioral: Negative for confusion and decreased concentration.        Personal History     Past Medical History:   Diagnosis Date   • Anxiety    • Crohn's disease (CMS/HCC)    • DDD (degenerative disc disease), lumbosacral    • Depression with anxiety    • Hypertension    • Insomnia    • Neuropathy      Past Surgical History:   Procedure Laterality Date   • APPENDECTOMY     • CHOLECYSTECTOMY     • COLONOSCOPY  2018   • HEMICOLECTOMY Bilateral     X2   • LUMBAR DISC SURGERY     • LUMBAR LAMINECTOMY DISCECTOMY DECOMPRESSION N/A 3/9/2017    Procedure: OPEN REVISION L5/S1 DISCECTOMY POSTERIOR AND L4-5 RIGHT;  Surgeon: Drew Taylor MD;  Location: [facility];  Service:      Family History   Problem Relation Age of Onset   • Coronary artery disease Mother    • Heart failure Father    • Dementia Father    • Malig Hyperthermia Neg Hx      Social History     Tobacco Use   • Smoking status: Never Smoker   • Smokeless tobacco: Never Used   Substance Use Topics   • Alcohol use: Yes     Comment: socially   • Drug use: Yes     Types: Hydrocodone     Current Facility-Administered Medications on File Prior to Encounter   Medication Dose Route Frequency Provider Last Rate Last Admin   • [DISCONTINUED] lactated ringers infusion 1,000 mL  1,000 mL Intravenous Continuous Sarthak Ruby MD   Stopped at 12/31/20 1040     Current Outpatient Medications on File Prior to Encounter   Medication Sig Dispense Refill   • Adalimumab (HUMIRA PEN-CROHNS STARTER) 40 MG/0.8ML Pen-injector Kit Inject 1 each under the skin into the  appropriate area as directed Every 14 (Fourteen) Days.     • Calcium Citrate-Vitamin D (CALCIUM + D PO) Take 1 tablet by mouth Daily.     • Chlorpheniramine Maleate (ALLERGY PO) Take 1 tablet by mouth Daily As Needed.     • clonazePAM (KlonoPIN) 0.5 MG tablet Take 0.5 mg by mouth 3 (Three) Times a Day As Needed.     • cyanocobalamin 1000 MCG/ML injection 1,000 mcg Every 28 (Twenty-Eight) Days.     • cyclobenzaprine (FLEXERIL) 10 MG tablet Take 10 mg by mouth 4 (Four) Times a Day.     • dicyclomine (BENTYL) 20 MG tablet Take 20 mg by mouth Every 6 (Six) Hours As Needed.     • diphenhydrAMINE (BENADRYL) 25 mg capsule Take 25 mg by mouth Every 6 (Six) Hours As Needed for Allergies.     • diphenoxylate-atropine (LOMOTIL) 2.5-0.025 MG per tablet Take 1 tablet by mouth 4 (Four) Times a Day As Needed for diarrhea.     • DULoxetine (CYMBALTA) 60 MG capsule Take 90 mg by mouth Daily.     • gabapentin (NEURONTIN) 300 MG capsule TAKE 1 CAPSULE BY MOUTH 2 TIMES A DAY (Patient taking differently: Take 300 mg by mouth 3 (Three) Times a Day.) 180 capsule 0   • metoprolol succinate XL (TOPROL-XL) 50 MG 24 hr tablet Take 50 mg by mouth Daily.     • omeprazole (priLOSEC) 20 MG capsule Take 20 mg by mouth Daily.     • promethazine (PHENERGAN) 25 MG tablet Take 25 mg by mouth Every 6 (Six) Hours As Needed.     • zolpidem (AMBIEN) 10 MG tablet Take 10 mg by mouth At Night As Needed.     • azithromycin (ZITHROMAX Z-DENISE) 250 MG tablet Take 2 tablets the first day, then 1 tablet daily for 4 days. 6 tablet 0   • HYDROcodone-acetaminophen (NORCO) 7.5-325 MG per tablet Take 2 tablets by mouth Every 4 (Four) Hours As Needed for moderate pain (4-6).  0     Allergies   Allergen Reactions   • Augmentin [Amoxicillin-Pot Clavulanate] Other (See Comments) and Diarrhea     gi upset only not allergic  AVOIDS DUE TO CROHNS       Objective    Objective     Vital Signs  Temp:  [96.6 °F (35.9 °C)-97.6 °F (36.4 °C)] 97.6 °F (36.4 °C)  Heart Rate:  []  97  Resp:  [16-18] 16  BP: (104-120)/(74-82) 120/81  SpO2:  [95 %-99 %] 97 %  on   ;   Device (Oxygen Therapy): room air  Body mass index is 24.41 kg/m².    Physical Exam  Constitutional:       General: He is not in acute distress.     Appearance: Normal appearance. He is ill-appearing. He is not toxic-appearing.   HENT:      Head: Normocephalic and atraumatic.      Nose: Nose normal.      Mouth/Throat:      Mouth: Mucous membranes are moist.   Eyes:      General:         Right eye: No discharge.         Left eye: No discharge.      Extraocular Movements: Extraocular movements intact.      Conjunctiva/sclera: Conjunctivae normal.   Neck:      Musculoskeletal: Normal range of motion and neck supple.   Cardiovascular:      Rate and Rhythm: Normal rate and regular rhythm.      Pulses: Normal pulses.      Heart sounds: Normal heart sounds. No murmur.   Pulmonary:      Effort: No respiratory distress.      Breath sounds: Normal breath sounds.   Abdominal:      General: There is distension.      Palpations: Abdomen is soft. There is no mass.      Tenderness: There is abdominal tenderness.      Comments: Hypoactive BS   Musculoskeletal: Normal range of motion.         General: No swelling or tenderness.   Skin:     General: Skin is warm and dry.      Coloration: Skin is not jaundiced.   Neurological:      General: No focal deficit present.      Mental Status: He is alert and oriented to person, place, and time.   Psychiatric:         Mood and Affect: Mood normal.         Results Review:  I reviewed the patient's new clinical results.  I reviewed the patient's new imaging results and agree with the interpretation.      Lab Results (last 24 hours)     Procedure Component Value Units Date/Time    CBC & Differential [[id number]]  (Abnormal) Collected: 01/01/21 0822    Specimen: Blood Updated: 01/01/21 0841    Narrative:      The following orders were created for panel order CBC & Differential.  Procedure                                Abnormality         Status                     ---------                               -----------         ------                     CBC Auto Differential[[id number]]        Abnormal            Final result                 Please view results for these tests on the individual orders.    Comprehensive Metabolic Panel [[id number]]  (Abnormal) Collected: 01/01/21 0822    Specimen: Blood Updated: 01/01/21 0933     Glucose 155 mg/dL      BUN 8 mg/dL      Creatinine 0.84 mg/dL      Sodium 137 mmol/L      Potassium 3.8 mmol/L      Comment: Slight hemolysis detected by analyzer. Results may be affected.        Chloride 101 mmol/L      CO2 25.8 mmol/L      Calcium 8.6 mg/dL      Total Protein 7.6 g/dL      Albumin 4.30 g/dL      ALT (SGPT) 13 U/L      AST (SGOT) 15 U/L      Alkaline Phosphatase 75 U/L      Total Bilirubin 0.9 mg/dL      eGFR Non African Amer 94 mL/min/1.73      Globulin 3.3 gm/dL      A/G Ratio 1.3 g/dL      BUN/Creatinine Ratio 9.5     Anion Gap 10.2 mmol/L     Narrative:      GFR Normal >60  Chronic Kidney Disease <60  Kidney Failure <15      Lipase [[id number]]  (Abnormal) Collected: 01/01/21 0822    Specimen: Blood Updated: 01/01/21 0901     Lipase 12 U/L     CBC Auto Differential [[id number]]  (Abnormal) Collected: 01/01/21 0822    Specimen: Blood Updated: 01/01/21 0841     WBC 10.36 10*3/mm3      RBC 5.04 10*6/mm3      Hemoglobin 15.4 g/dL      Hematocrit 45.2 %      MCV 89.7 fL      MCH 30.6 pg      MCHC 34.1 g/dL      RDW 13.0 %      RDW-SD 41.9 fl      MPV 11.3 fL      Platelets 214 10*3/mm3      Neutrophil % 82.6 %      Lymphocyte % 11.2 %      Monocyte % 5.3 %      Eosinophil % 0.2 %      Basophil % 0.3 %      Immature Grans % 0.4 %      Neutrophils, Absolute 8.56 10*3/mm3      Lymphocytes, Absolute 1.16 10*3/mm3      Monocytes, Absolute 0.55 10*3/mm3      Eosinophils, Absolute 0.02 10*3/mm3      Basophils, Absolute 0.03 10*3/mm3      Immature Grans, Absolute 0.04 10*3/mm3      nRBC 0.0 /100  WBC     Protime-INR [[id number]]  (Normal) Collected: 01/01/21 0823    Specimen: Blood Updated: 01/01/21 0848     Protime 13.7 Seconds      INR 1.07    Urinalysis With Microscopic If Indicated (No Culture) - Urine, Clean Catch [[id number]]  (Abnormal) Collected: 01/01/21 0838    Specimen: Urine, Clean Catch Updated: 01/01/21 0857     Color, UA Dark Yellow     Appearance, UA Turbid     pH, UA 6.0     Specific Gravity, UA 1.015     Glucose, UA Negative     Ketones, UA Trace     Bilirubin, UA Negative     Blood, UA Negative     Protein, UA 30 mg/dL (1+)     Leuk Esterase, UA Negative     Nitrite, UA Negative     Urobilinogen, UA 1.0 E.U./dL    Urinalysis, Microscopic Only - Urine, Clean Catch [[id number]]  (Abnormal) Collected: 01/01/21 0838    Specimen: Urine, Clean Catch Updated: 01/01/21 0911     RBC, UA None Seen /HPF      WBC, UA 3-5 /HPF      Bacteria, UA None Seen /HPF      Squamous Epithelial Cells, UA 0-2 /HPF      Hyaline Casts, UA 7-12 /LPF      Calcium Oxalate Crystals, UA Large/3+ /HPF      Mucus, UA Moderate/2+ /HPF      Methodology Manual Light Microscopy          Imaging Results (Last 24 Hours)     Procedure Component Value Units Date/Time    CT Abdomen Pelvis With Contrast [[id number]] Collected: 01/01/21 1017     Updated: 01/01/21 1024    Narrative:      CT ABDOMEN PELVIS W CONTRAST-     INDICATIONS: Pain, history of bowel obstruction     TECHNIQUE: Radiation dose reduction techniques were utilized, including  automated exposure control and exposure modulation based on body size.  Enhanced ABDOMEN AND PELVIS CT     COMPARISON: 04/04/2015     FINDINGS:     The proximal small bowel is abnormally dilated and fluid-filled with  transition to collapsed caliber in the right aspect of the abdomen,  compatible small bowel obstruction. Proximal right colectomy change is  apparent.           The gallbladder is surgically absent.     Pancreatic head is fatty infiltrated.     A subcentimeter right renal low density  is too small to characterize.     Otherwise unremarkable appearance of the liver, spleen, adrenal glands,  pancreas, kidneys, bladder.                 No free intraperitoneal gas. Mild pelvic free fluid.     Scattered small mesenteric and para-aortic lymph nodes are seen that are  not significant by size criteria.     Abdominal aorta is not aneurysmal. Aortic and other arterial  calcifications are present.     The lung bases show small atelectasis, trace pleural effusions.  Degenerative changes are seen in the spine. No acute fracture is  identified.             Impression:         Small bowel obstruction.     Discussed by telephone with Dr. Conroy at 1017, 01/01/2021.     This report was finalized on 1/1/2021 10:21 AM by Dr. Angelo Nash M.D.                  No orders to display        Assessment/Plan     Active Hospital Problems    Diagnosis  POA   • Small bowel obstruction (CMS/HCC) [K56.609]  Yes   • History of Crohn's disease [Z87.19]  Not Applicable   • HTN (hypertension) [I10]  Unknown   • Degeneration of lumbar intervertebral disc [M51.36]  Yes      Resolved Hospital Problems   No resolved problems to display.     Mr. Dai is a 55 y.o. male never smoker with a history of Crohn's disease and hypertension that presents to [facility] complaining of abdominal pain and vomiting which began yesterday mid afternoon after his routine colonoscopy procedure    Small bowel obstruction/history of Crohn's disease  Dilated and fluid-filled small bowel evident on CT scan.  WBC normal.  Chemistries unremarkable.  Lipase 12.  Treat pain and nausea.  Discussed with patient caution with balance between pain control and opioids hindering resolution of bowel obstruction.  Continue IV fluids.  Keep NPO.    Consult general surgery.  No NG tube for now, will defer to surgery.  One episode of vomiting only.  Abdomen slightly distended and tender.  Consult Dr. Sarthak Ruby for Crohn's  management.    Hypertension  Readings stable.  Continue only home dose metoprolol for now.    · I discussed the patient's findings and my recommendations with patient and Dr. Elizalde.    VTE Prophylaxis - SCDs.  Code Status - Full code.       ITZ Chopra  [facility]  01/01/21  12:46 EST

## 2021-01-01 NOTE — ED NOTES
Reassurance given; call light in reach. Pts breathing even and unlabored. Pt appears in NAD at this time. Pt wearing mask. This RN wearing mask and safety glasses. Pt given warm blanket for comfort.      Erma Gillette, RN  01/01/21 0831

## 2021-01-01 NOTE — ED PROVIDER NOTES
EMERGENCY DEPARTMENT ENCOUNTER    Room Number:  33/33  Date of encounter:  1/1/2021  PCP: Andria Wolfe APRN  Historian: Patient      HPI:  Chief Complaint: Abdominal pain      Context: Jeronimo Dai is a 55 y.o. male who presents to the ED c/o abdominal pain since colonoscopy yesterday.  The patient has a history of Crohn's disease and states he gets colonoscopy every several years.  He also has had an appendectomy and right hemicolectomy in the past.  He states yesterday was a routine colonoscopy but since that time has not quite felt right.  He states he has had some abdominal distention and pain with nausea.  He denies diarrhea, fevers, chills, cough, shortness of breath, chest pain, back pain or known Covid 19 exposure.      PAST MEDICAL HISTORY  Active Ambulatory Problems     Diagnosis Date Noted   • Degeneration of lumbar intervertebral disc 01/16/2017     Resolved Ambulatory Problems     Diagnosis Date Noted   • Lumbar disc herniation with radiculopathy 01/16/2017   • Surgery follow-up examination 03/23/2017     Past Medical History:   Diagnosis Date   • Anxiety    • Crohn's disease (CMS/[facility])    • DDD (degenerative disc disease), lumbosacral    • Depression with anxiety    • Hypertension    • Insomnia    • Neuropathy          PAST SURGICAL HISTORY  Past Surgical History:   Procedure Laterality Date   • APPENDECTOMY     • CHOLECYSTECTOMY     • COLONOSCOPY  2018   • HEMICOLECTOMY Bilateral     X2   • LUMBAR DISC SURGERY     • LUMBAR LAMINECTOMY DISCECTOMY DECOMPRESSION N/A 3/9/2017    Procedure: OPEN REVISION L5/S1 DISCECTOMY POSTERIOR AND L4-5 RIGHT;  Surgeon: Drew Taylor MD;  Location: [facility];  Service:          FAMILY HISTORY  Family History   Problem Relation Age of Onset   • Coronary artery disease Mother    • Heart failure Father    • Dementia Father    • Malig Hyperthermia Neg Hx          SOCIAL HISTORY  Social History     Socioeconomic History   • Marital status: Single     Spouse  name: Not on file   • Number of children: Not on file   • Years of education: Not on file   • Highest education level: Not on file   Tobacco Use   • Smoking status: Never Smoker   • Smokeless tobacco: Never Used   Substance and Sexual Activity   • Alcohol use: Yes     Comment: socially   • Drug use: Yes     Types: Hydrocodone   • Sexual activity: Defer         ALLERGIES  Augmentin [amoxicillin-pot clavulanate]        REVIEW OF SYSTEMS  Review of Systems         All systems reviewed and negative except for those discussed in HPI.     PHYSICAL EXAM    I have reviewed the triage vital signs and nursing notes.    ED Triage Vitals   Temp Heart Rate Resp BP SpO2   01/01/21 0806 01/01/21 0806 01/01/21 0806 01/01/21 0822 01/01/21 0806   96.6 °F (35.9 °C) (!) 125 16 113/82 99 %      Temp src Heart Rate Source Patient Position BP Location FiO2 (%)   01/01/21 0806 01/01/21 0806 -- -- --   Tympanic Monitor          GENERAL: 55-year-old well developed, well nourished in mild distress  HENT: NCAT, neck supple, trachea midline  EYES: no scleral icterus, PERRL, normal conjunctiva  CV: regular rhythm, regular rate, no murmur  RESPIRATORY: unlabored effort, CTAB  ABDOMEN: soft, mild diffuse with no guarding or rebound, non-distended, bowel sounds diminished  MUSCULOSKELETAL: no gross deformity, no pedal edema, no calf tenderness  NEURO: alert,  sensory and motor function of extremities grossly intact, speech clear, mental status normal  SKIN: warm, dry, no rash  PSYCH:  Appropriate mood and affect      PPE  Pt does not present with symptoms for COVID19; however, I was wearing a mask and goggles throughout all patient interaction.    Vital signs and nursing notes reviewed.      LAB RESULTS  Recent Results (from the past 24 hour(s))   Comprehensive Metabolic Panel    Collection Time: 01/01/21  8:22 AM    Specimen: Blood   Result Value Ref Range    Glucose 155 (H) 65 - 99 mg/dL    BUN 8 6 - 20 mg/dL    Creatinine 0.84 0.76 - 1.27 mg/dL     Sodium 137 136 - 145 mmol/L    Potassium 3.8 3.5 - 5.2 mmol/L    Chloride 101 98 - 107 mmol/L    CO2 25.8 22.0 - 29.0 mmol/L    Calcium 8.6 8.6 - 10.5 mg/dL    Total Protein 7.6 6.0 - 8.5 g/dL    Albumin 4.30 3.50 - 5.20 g/dL    ALT (SGPT) 13 1 - 41 U/L    AST (SGOT) 15 1 - 40 U/L    Alkaline Phosphatase 75 39 - 117 U/L    Total Bilirubin 0.9 0.0 - 1.2 mg/dL    eGFR Non African Amer 94 >60 mL/min/1.73    Globulin 3.3 gm/dL    A/G Ratio 1.3 g/dL    BUN/Creatinine Ratio 9.5 7.0 - 25.0    Anion Gap 10.2 5.0 - 15.0 mmol/L   Lipase    Collection Time: 01/01/21  8:22 AM    Specimen: Blood   Result Value Ref Range    Lipase 12 (L) 13 - 60 U/L   CBC Auto Differential    Collection Time: 01/01/21  8:22 AM    Specimen: Blood   Result Value Ref Range    WBC 10.36 3.40 - 10.80 10*3/mm3    RBC 5.04 4.14 - 5.80 10*6/mm3    Hemoglobin 15.4 13.0 - 17.7 g/dL    Hematocrit 45.2 37.5 - 51.0 %    MCV 89.7 79.0 - 97.0 fL    MCH 30.6 26.6 - 33.0 pg    MCHC 34.1 31.5 - 35.7 g/dL    RDW 13.0 12.3 - 15.4 %    RDW-SD 41.9 37.0 - 54.0 fl    MPV 11.3 6.0 - 12.0 fL    Platelets 214 140 - 450 10*3/mm3    Neutrophil % 82.6 (H) 42.7 - 76.0 %    Lymphocyte % 11.2 (L) 19.6 - 45.3 %    Monocyte % 5.3 5.0 - 12.0 %    Eosinophil % 0.2 (L) 0.3 - 6.2 %    Basophil % 0.3 0.0 - 1.5 %    Immature Grans % 0.4 0.0 - 0.5 %    Neutrophils, Absolute 8.56 (H) 1.70 - 7.00 10*3/mm3    Lymphocytes, Absolute 1.16 0.70 - 3.10 10*3/mm3    Monocytes, Absolute 0.55 0.10 - 0.90 10*3/mm3    Eosinophils, Absolute 0.02 0.00 - 0.40 10*3/mm3    Basophils, Absolute 0.03 0.00 - 0.20 10*3/mm3    Immature Grans, Absolute 0.04 0.00 - 0.05 10*3/mm3    nRBC 0.0 0.0 - 0.2 /100 WBC   Protime-INR    Collection Time: 01/01/21  8:23 AM    Specimen: Blood   Result Value Ref Range    Protime 13.7 11.7 - 14.2 Seconds    INR 1.07 0.90 - 1.10   Urinalysis With Microscopic If Indicated (No Culture) - Urine, Clean Catch    Collection Time: 01/01/21  8:38 AM    Specimen: Urine, Clean Catch    Result Value Ref Range    Color, UA Dark Yellow (A) Yellow, Straw    Appearance, UA Turbid (A) Clear    pH, UA 6.0 5.0 - 8.0    Specific Gravity, UA 1.015 1.005 - 1.030    Glucose, UA Negative Negative    Ketones, UA Trace (A) Negative    Bilirubin, UA Negative Negative    Blood, UA Negative Negative    Protein, UA 30 mg/dL (1+) (A) Negative    Leuk Esterase, UA Negative Negative    Nitrite, UA Negative Negative    Urobilinogen, UA 1.0 E.U./dL 0.2 - 1.0 E.U./dL   Urinalysis, Microscopic Only - Urine, Clean Catch    Collection Time: 01/01/21  8:38 AM    Specimen: Urine, Clean Catch   Result Value Ref Range    RBC, UA None Seen None Seen, 0-2 /HPF    WBC, UA 3-5 (A) None Seen, 0-2 /HPF    Bacteria, UA None Seen None Seen /HPF    Squamous Epithelial Cells, UA 0-2 None Seen, 0-2 /HPF    Hyaline Casts, UA 7-12 None Seen /LPF    Calcium Oxalate Crystals, UA Large/3+ None Seen /HPF    Mucus, UA Moderate/2+ (A) None Seen, Trace /HPF    Methodology Manual Light Microscopy        Ordered the above labs and independently reviewed the results.        RADIOLOGY  Ct Abdomen Pelvis With Contrast    Result Date: 1/1/2021  CT ABDOMEN PELVIS W CONTRAST-  INDICATIONS: Pain, history of bowel obstruction  TECHNIQUE: Radiation dose reduction techniques were utilized, including automated exposure control and exposure modulation based on body size. Enhanced ABDOMEN AND PELVIS CT  COMPARISON: 04/04/2015  FINDINGS:  The proximal small bowel is abnormally dilated and fluid-filled with transition to collapsed caliber in the right aspect of the abdomen, compatible small bowel obstruction. Proximal right colectomy change is apparent.    The gallbladder is surgically absent.  Pancreatic head is fatty infiltrated.  A subcentimeter right renal low density is too small to characterize.  Otherwise unremarkable appearance of the liver, spleen, adrenal glands, pancreas, kidneys, bladder.      No free intraperitoneal gas. Mild pelvic free fluid.   Scattered small mesenteric and para-aortic lymph nodes are seen that are not significant by size criteria.  Abdominal aorta is not aneurysmal. Aortic and other arterial calcifications are present.  The lung bases show small atelectasis, trace pleural effusions. Degenerative changes are seen in the spine. No acute fracture is identified.         Small bowel obstruction.  Discussed by telephone with Dr. Conroy at 1017, 01/01/2021.  This report was finalized on 1/1/2021 10:21 AM by Dr. Angelo Nash M.D.        I ordered the above noted radiological studies. Independently reviewed by me and discussed with radiologist.  See dictation above for official radiology interpretation.      PROCEDURES    Procedures        MEDICATIONS GIVEN IN ER    Medications   sodium chloride 0.9 % flush 10 mL (has no administration in time range)   sodium chloride 0.9 % flush 10 mL (has no administration in time range)   nitroglycerin (NITROSTAT) SL tablet 0.4 mg (has no administration in time range)   sodium chloride 0.9 % infusion (has no administration in time range)   ondansetron (ZOFRAN) injection 4 mg (has no administration in time range)   sodium chloride 0.9 % bolus 500 mL (0 mL Intravenous Stopped 1/1/21 0900)   HYDROmorphone (DILAUDID) injection 0.5 mg (0.5 mg Intravenous Given 1/1/21 0834)   ondansetron (ZOFRAN) injection 4 mg (4 mg Intravenous Given 1/1/21 0834)   HYDROmorphone (DILAUDID) injection 0.5 mg (0.5 mg Intravenous Given 1/1/21 1029)   iopamidol (ISOVUE-300) 61 % injection 100 mL (85 mL Intravenous Given by Other 1/1/21 1001)         PROGRESS, DATA ANALYSIS, CONSULTS, AND MEDICAL DECISION MAKING    All labs have been independently reviewed by me.  All radiology studies have been reviewed by me and discussed with radiologist dictating report.   EKG's independently reviewed by me.  Discussion below represents my analysis of pertinent findings related to patient's condition, differential diagnosis, treatment plan and  final disposition.      ED Course as of Jan 01 1209   Fri Jan 01, 2021   1019 I just discussed the patient's abdominal CT with Dr. Nash from radiology.  He states the patient does have a small bowel obstruction with a transition point in his right abdomen.    [GP]   1037 I discussed the case with Dr. Torres from surgery.  He states he will come and see the patient.  He asked that I call the hospitalist for admission.    [GP]   1053 I discussed the case with Dr. Elizalde from [facility].  He will admit the patient to a [facility] bed.    [GP]   1059 I advised the patient that Dr. Morejon will consult and he will be admitted to the hospital.  The patient understands and agrees with plan.    [GP]      ED Course User Index  [GP] Alex Conroy MD           The differential diagnosis includes but is not limited to small bowel obstruction, ileus, post procedure discomfort, diverticulitis or perforation        AS OF 12:09 EST VITALS:    BP - 104/74  HR - 95  TEMP - 97.4 °F (36.3 °C) (Tympanic)  02 SATS - 95%        DIAGNOSIS  Final diagnoses:   Small bowel obstruction (CMS/HCC)   Crohn's disease with complication, unspecified gastrointestinal tract location (CMS/HCC)         DISPOSITION  ADMISSION    Discussed treatment plan and reason for admission with pt/family and admitting physician.  Pt/family voiced understanding of the plan for admission for further testing/treatment as needed.        EMR Dragon/Transcription disclaimer:   Much of this encounter note is an electronic transcription/translation of spoken language to printed text.       No scribe was used     Alex Conroy MD  01/01/21 0136

## 2021-01-01 NOTE — ED TRIAGE NOTES
Pt to ER via [facility]. Pt states he had a colonoscopy yesterday and since has had abdominal pain and fullness. Pt states hx of bowel obstructions.     Patient in mask. This RN in appropriate PPE - including mask, goggles, and gloves during all of patient care.

## 2021-01-01 NOTE — CONSULTS
Cc: Abdominal pain, vomiting    History of presenting illness:   This is a nice, 55-year-old gentleman with a history of Crohn's disease, seemingly reasonably well controlled who underwent an outpatient colonoscopy yesterday.  He tolerated his bowel prep without issue, but after his colonoscopy says he was more distended and had more abdominal pain than he typically would expect.  He did not eat much yesterday and woke up this morning feeling more distended and with more pain.  He did go to the bathroom and had a small bowel movement but it right after that had some vomiting, which prompted him to come to the emergency room.  Since being here he has not vomited any further and he does feel the antiemetics have aided his nausea.  His pain is improved, although not resolved.  He does say that he has started passing a little bit of flatus this afternoon.  He has had past episodes of small bowel obstructions, although none since 2015.    Past Medical History: Crohn's disease, hypertension, anxiety, back pain, prior problem with narcotic use, but he has been off that for a few years    Past Surgical History: Remote appendectomy.  He has had a cholecystectomy done a couple of years ago.  He had what sounds like either an ileocecal resection or right colectomy done in the 1980s.  In 2004, Dr. James did what sounds like an ileocolic resection, for recurrent Crohn's disease.  Other than his cholecystectomy (he says he was told that there was not an excessive amount of scar tissue) he has had no further abdominal surgery since that time.    Medications: He takes Humira, no anticoagulants    Allergies: Augmentin    Social History: He is a non-smoker    Family History: Negative for known colon and rectal cancer    Review of Systems:  Constitutional: Positive for decreased appetite, negative for fever or chills  Neck: no swollen glands or dysphagia or odynophagia  Respiratory: negative for SOB, cough, hemoptysis or  wheezing  Cardiovascular: negative for chest pain, palpitations or peripheral edema  Gastrointestinal: As it for nausea, vomiting, abdominal pain      Physical Exam:   Body mass index 24.4  Temperature 97.6 heart rate 97 blood pressure 120/81  General: alert and oriented, appropriate, no acute distress, asleep when I entered the room but easily awakened  Neck: Supple without lymphadenopathy or thyromegaly, trachea is in the midline  Respiratory: Lungs are clear bilaterally without wheezing, no use of accessory muscles is noted  Cardiovascular: Regular rate and rhythm without murmur, no peripheral edema  Gastrointestinal: Soft and mildly distended.  There is only mild tenderness.  Bowel sounds are positive.  No guarding or rebound tenderness.    Laboratory data: White blood cell count 10.3 hemoglobin 15.4 chemistries are in reasonable order    Imaging data: CT images are reviewed by me.  The stomach is moderately distended and the proximal small bowel is as well.  There is transition to distally decompressed small bowel, but there is significant gas throughout the colon.  It is unclear whether this is all residual gas.      Assessment and plan:   -Small bowel obstruction, favor partial  -Is difficult to tell what the proximity to his colonoscopy has to do with the underlying situation, but at any rate he seems to be feeling a little bit better with conservative measures including IV fluids at this time.  I do not think nasogastric tube needs to be passed for the moment.  We will follow along with you, but at this time there is no indication for any surgical intervention.  His obstruction, such as it is seems more likely to be related to adhesive disease, rather than a Crohn's flare.  I do not suspect it is necessary to start steroids, but I would defer to the gastroenterology service.  We will follow along with you.      Trent Morejon MD, FACS  General, Minimally Invasive and Endoscopic Surgery  [facility]    [address], [address]  [place], [place], [address]  P: [phone]  F: [phone]

## 2021-01-01 NOTE — PLAN OF CARE
Problem: Adult Inpatient Plan of Care  Goal: Absence of Hospital-Acquired Illness or Injury  Intervention: Identify and Manage Fall Risk  Recent Flowsheet Documentation  Taken 1/1/2021 1245 by Erma Stein RN  Safety Promotion/Fall Prevention: safety round/check completed  Intervention: Prevent Infection  Recent Flowsheet Documentation  Taken 1/1/2021 1245 by Erma Stein, RN  Infection Prevention: hand hygiene promoted   Goal Outcome Evaluation:         Pt. Arrived from the ER will a small bowel obstruction. He had a colonoscopy yesterday(12/31/2020) and just didn't feel right after it. Pt has had many of these in the past and is aware of what to expect at this time. He is currently on IV fluids going at 125ml/hr.He is NPO, on IV pain medication, and IV zofran. Pt has a history of Chron's disease. VSS. Will continue to monitor.

## 2021-01-02 ENCOUNTER — APPOINTMENT (OUTPATIENT)
Dept: GENERAL RADIOLOGY | Facility: HOSPITAL | Age: 56
End: 2021-01-02

## 2021-01-02 PROCEDURE — 99253 IP/OBS CNSLTJ NEW/EST LOW 45: CPT | Performed by: INTERNAL MEDICINE

## 2021-01-02 PROCEDURE — 99232 SBSQ HOSP IP/OBS MODERATE 35: CPT | Performed by: SURGERY

## 2021-01-02 PROCEDURE — 25010000002 HYDROMORPHONE PER 4 MG: Performed by: INTERNAL MEDICINE

## 2021-01-02 PROCEDURE — 25010000002 PROCHLORPERAZINE 10 MG/2ML SOLUTION: Performed by: INTERNAL MEDICINE

## 2021-01-02 PROCEDURE — 25010000002 METHYLPREDNISOLONE PER 40 MG: Performed by: INTERNAL MEDICINE

## 2021-01-02 PROCEDURE — 74019 RADEX ABDOMEN 2 VIEWS: CPT

## 2021-01-02 PROCEDURE — 25010000002 ONDANSETRON PER 1 MG: Performed by: NURSE PRACTITIONER

## 2021-01-02 RX ORDER — PROCHLORPERAZINE EDISYLATE 5 MG/ML
10 INJECTION INTRAMUSCULAR; INTRAVENOUS EVERY 6 HOURS PRN
Status: DISCONTINUED | OUTPATIENT
Start: 2021-01-02 | End: 2021-01-04 | Stop reason: HOSPADM

## 2021-01-02 RX ORDER — METHYLPREDNISOLONE SODIUM SUCCINATE 40 MG/ML
20 INJECTION, POWDER, LYOPHILIZED, FOR SOLUTION INTRAMUSCULAR; INTRAVENOUS EVERY 8 HOURS
Status: DISCONTINUED | OUTPATIENT
Start: 2021-01-02 | End: 2021-01-04

## 2021-01-02 RX ORDER — MAGNESIUM SULFATE HEPTAHYDRATE 40 MG/ML
2 INJECTION, SOLUTION INTRAVENOUS AS NEEDED
Status: DISCONTINUED | OUTPATIENT
Start: 2021-01-02 | End: 2021-01-04 | Stop reason: HOSPADM

## 2021-01-02 RX ORDER — POTASSIUM CHLORIDE 1.5 G/1.77G
40 POWDER, FOR SOLUTION ORAL AS NEEDED
Status: DISCONTINUED | OUTPATIENT
Start: 2021-01-02 | End: 2021-01-04 | Stop reason: HOSPADM

## 2021-01-02 RX ORDER — POTASSIUM CHLORIDE 750 MG/1
40 TABLET, FILM COATED, EXTENDED RELEASE ORAL AS NEEDED
Status: DISCONTINUED | OUTPATIENT
Start: 2021-01-02 | End: 2021-01-04 | Stop reason: HOSPADM

## 2021-01-02 RX ORDER — MAGNESIUM SULFATE HEPTAHYDRATE 40 MG/ML
4 INJECTION, SOLUTION INTRAVENOUS AS NEEDED
Status: DISCONTINUED | OUTPATIENT
Start: 2021-01-02 | End: 2021-01-04 | Stop reason: HOSPADM

## 2021-01-02 RX ADMIN — HYDROMORPHONE HYDROCHLORIDE 0.5 MG: 1 INJECTION, SOLUTION INTRAMUSCULAR; INTRAVENOUS; SUBCUTANEOUS at 06:02

## 2021-01-02 RX ADMIN — PANTOPRAZOLE SODIUM 40 MG: 40 INJECTION, POWDER, FOR SOLUTION INTRAVENOUS at 06:02

## 2021-01-02 RX ADMIN — ONDANSETRON 4 MG: 2 INJECTION INTRAMUSCULAR; INTRAVENOUS at 14:17

## 2021-01-02 RX ADMIN — SODIUM CHLORIDE, PRESERVATIVE FREE 10 ML: 5 INJECTION INTRAVENOUS at 21:16

## 2021-01-02 RX ADMIN — HYDROMORPHONE HYDROCHLORIDE 0.5 MG: 1 INJECTION, SOLUTION INTRAMUSCULAR; INTRAVENOUS; SUBCUTANEOUS at 08:39

## 2021-01-02 RX ADMIN — HYDROMORPHONE HYDROCHLORIDE 0.5 MG: 1 INJECTION, SOLUTION INTRAMUSCULAR; INTRAVENOUS; SUBCUTANEOUS at 19:44

## 2021-01-02 RX ADMIN — HYDROMORPHONE HYDROCHLORIDE 0.5 MG: 1 INJECTION, SOLUTION INTRAMUSCULAR; INTRAVENOUS; SUBCUTANEOUS at 21:48

## 2021-01-02 RX ADMIN — HYDROMORPHONE HYDROCHLORIDE 0.5 MG: 1 INJECTION, SOLUTION INTRAMUSCULAR; INTRAVENOUS; SUBCUTANEOUS at 04:05

## 2021-01-02 RX ADMIN — METHYLPREDNISOLONE SODIUM SUCCINATE 20 MG: 40 INJECTION, POWDER, FOR SOLUTION INTRAMUSCULAR; INTRAVENOUS at 19:43

## 2021-01-02 RX ADMIN — HYDROMORPHONE HYDROCHLORIDE 0.5 MG: 1 INJECTION, SOLUTION INTRAMUSCULAR; INTRAVENOUS; SUBCUTANEOUS at 00:44

## 2021-01-02 RX ADMIN — HYDROMORPHONE HYDROCHLORIDE 0.5 MG: 1 INJECTION, SOLUTION INTRAMUSCULAR; INTRAVENOUS; SUBCUTANEOUS at 14:12

## 2021-01-02 RX ADMIN — METOPROLOL SUCCINATE 50 MG: 50 TABLET, EXTENDED RELEASE ORAL at 08:39

## 2021-01-02 RX ADMIN — ONDANSETRON 4 MG: 2 INJECTION INTRAMUSCULAR; INTRAVENOUS at 07:49

## 2021-01-02 RX ADMIN — HYDROMORPHONE HYDROCHLORIDE 0.5 MG: 1 INJECTION, SOLUTION INTRAMUSCULAR; INTRAVENOUS; SUBCUTANEOUS at 10:39

## 2021-01-02 RX ADMIN — ONDANSETRON 4 MG: 2 INJECTION INTRAMUSCULAR; INTRAVENOUS at 00:44

## 2021-01-02 RX ADMIN — PROCHLORPERAZINE EDISYLATE 10 MG: 5 INJECTION INTRAMUSCULAR; INTRAVENOUS at 21:48

## 2021-01-02 RX ADMIN — PROCHLORPERAZINE EDISYLATE 10 MG: 5 INJECTION INTRAMUSCULAR; INTRAVENOUS at 15:51

## 2021-01-02 RX ADMIN — SODIUM CHLORIDE, PRESERVATIVE FREE 10 ML: 5 INJECTION INTRAVENOUS at 08:39

## 2021-01-02 RX ADMIN — SODIUM CHLORIDE 125 ML/HR: 9 INJECTION, SOLUTION INTRAVENOUS at 22:22

## 2021-01-02 NOTE — CONSULTS
[facility]  Initial Inpatient Consult Note    Referring Provider: A    Reason for Consultation: Small bowel obstruction status post colonoscopy, history of Crohn's    Subjective     History of present illness:    Thank you for asking my opinion regarding this patient  55 y.o. male, patient of Dr. Sarthak Ruby, that we are asked to see for Crohn's disease, small bowel obstruction.  Patient had a colonoscopy on 12/31.  Terminal ileum was notable for multiple erosions.  Biopsies are pending.  Patient is maintained on Humira every 14 days.  He presented yesterday with pain in his abdomen.  CT of the abdomen and pelvis, reviewed by me reflects small bowel obstruction.    Has a history of an appendectomy, cholecystectomy.  He had either an ileocecal resection versus right colectomy in the 80s.  He also had an ileocolic resection for recurrent Crohn's performed in 2004.    He reports that after his colonoscopy 3 days ago he felt a little more crampy than he typically does.  Over the course of the day his symptoms worsened.  The following morning he had continued progression of his symptoms and he came in for further evaluation.  Prior to the colonoscopy he had had increased symptoms related to his Crohn's, specifically watery diarrhea up to 10 bowel movements daily and abdominal cramping.  He had no recent fevers or chills.  No recent interruption of his Humira therapy.  He does report that about 2 years ago there was a period where he could not obtain his Humira for insurance reasons.    He is feeling somewhat better over the past couple of hours from a small bowel obstruction standpoint.  He tolerating small amounts of clear liquids and has had a few small liquid bowel movements.  He is having less pain and nausea.    Past Medical History:  Past Medical History:   Diagnosis Date   • Anxiety    • Crohn's disease (CMS/[facility])    • DDD (degenerative disc disease), lumbosacral    • Depression with  anxiety    • Hypertension    • Insomnia    • Neuropathy      Past Surgical History:  Past Surgical History:   Procedure Laterality Date   • APPENDECTOMY     • CHOLECYSTECTOMY     • COLONOSCOPY  2018   • HEMICOLECTOMY Bilateral     X2   • LUMBAR DISC SURGERY     • LUMBAR LAMINECTOMY DISCECTOMY DECOMPRESSION N/A 3/9/2017    Procedure: OPEN REVISION L5/S1 DISCECTOMY POSTERIOR AND L4-5 RIGHT;  Surgeon: Drew Taylor MD;  Location: [facility] OR;  Service:       Social History:   Social History     Tobacco Use   • Smoking status: Never Smoker   • Smokeless tobacco: Never Used   Substance Use Topics   • Alcohol use: Yes     Comment: socially      Family History:  Family History   Problem Relation Age of Onset   • Coronary artery disease Mother    • Heart failure Father    • Dementia Father    • Malig Hyperthermia Neg Hx        Home Meds:  Medications Prior to Admission   Medication Sig Dispense Refill Last Dose   • Adalimumab (HUMIRA PEN-CROHNS STARTER) 40 MG/0.8ML Pen-injector Kit Inject 1 each under the skin into the appropriate area as directed Every 14 (Fourteen) Days.      • Calcium Citrate-Vitamin D (CALCIUM + D PO) Take 1 tablet by mouth Daily.      • Chlorpheniramine Maleate (ALLERGY PO) Take 1 tablet by mouth Daily As Needed.      • clonazePAM (KlonoPIN) 0.5 MG tablet Take 0.5 mg by mouth 3 (Three) Times a Day As Needed.      • cyanocobalamin 1000 MCG/ML injection 1,000 mcg Every 28 (Twenty-Eight) Days.      • cyclobenzaprine (FLEXERIL) 10 MG tablet Take 10 mg by mouth 4 (Four) Times a Day.      • dicyclomine (BENTYL) 20 MG tablet Take 20 mg by mouth Every 6 (Six) Hours As Needed.      • diphenhydrAMINE (BENADRYL) 25 mg capsule Take 25 mg by mouth Every 6 (Six) Hours As Needed for Allergies.      • diphenoxylate-atropine (LOMOTIL) 2.5-0.025 MG per tablet Take 1 tablet by mouth 4 (Four) Times a Day As Needed for diarrhea.      • DULoxetine (CYMBALTA) 60 MG capsule Take 90 mg by mouth Daily.      • gabapentin  (NEURONTIN) 300 MG capsule TAKE 1 CAPSULE BY MOUTH 2 TIMES A DAY (Patient taking differently: Take 300 mg by mouth 3 (Three) Times a Day.) 180 capsule 0    • metoprolol succinate XL (TOPROL-XL) 50 MG 24 hr tablet Take 50 mg by mouth Daily.      • omeprazole (priLOSEC) 20 MG capsule Take 20 mg by mouth Daily.      • promethazine (PHENERGAN) 25 MG tablet Take 25 mg by mouth Every 6 (Six) Hours As Needed.      • zolpidem (AMBIEN) 10 MG tablet Take 10 mg by mouth At Night As Needed.      • azithromycin (ZITHROMAX Z-DENISE) 250 MG tablet Take 2 tablets the first day, then 1 tablet daily for 4 days. 6 tablet 0    • HYDROcodone-acetaminophen (NORCO) 7.5-325 MG per tablet Take 2 tablets by mouth Every 4 (Four) Hours As Needed for moderate pain (4-6).  0      Current Meds:   metoprolol succinate XL, 50 mg, Oral, Daily  pantoprazole, 40 mg, Intravenous, Q AM  sodium chloride, 10 mL, Intravenous, Q12H      Allergies:  Allergies   Allergen Reactions   • Augmentin [Amoxicillin-Pot Clavulanate] Other (See Comments) and Diarrhea     gi upset only not allergic  AVOIDS DUE TO CROHNS     Review of Systems  Pertinent items are noted in HPI, all other systems reviewed and negative     Objective     Vital Signs  Temp:  [97.7 °F (36.5 °C)-98.9 °F (37.2 °C)] 98.9 °F (37.2 °C)  Heart Rate:  [] 100  Resp:  [16] 16  BP: (110-133)/(72-89) 128/84  Physical Exam:  General Appearance:    Alert, cooperative, in no acute distress   Head:    Normocephalic, without obvious abnormality, atraumatic   Eyes:          conjunctivae and sclerae normal, no   icterus   Throat:   no thrush, oral mucosa moist   Neck:   Supple, no adenopathy   Lungs:     Clear to auscultation bilaterally    Heart:    Regular rhythm and normal rate    Chest Wall:    No abnormalities observed   Abdomen:     Soft, mildly distended, nontender; hyperactive bowel sounds   Extremities:   no edema, no redness   Skin:   No bruising or rash   Psychiatric:  normal mood and insight      Results Review:   I reviewed the patient's new clinical results.    Results from last 7 days   Lab Units 01/01/21  0822   WBC 10*3/mm3 10.36   HEMOGLOBIN g/dL 15.4   HEMATOCRIT % 45.2   PLATELETS 10*3/mm3 214     Results from last 7 days   Lab Units 01/01/21  0822   SODIUM mmol/L 137   POTASSIUM mmol/L 3.8   CHLORIDE mmol/L 101   CO2 mmol/L 25.8   BUN mg/dL 8   CREATININE mg/dL 0.84   CALCIUM mg/dL 8.6   BILIRUBIN mg/dL 0.9   ALK PHOS U/L 75   ALT (SGPT) U/L 13   AST (SGOT) U/L 15   GLUCOSE mg/dL 155*     Results from last 7 days   Lab Units 01/01/21  0823   INR  1.07     Lab Results   Lab Value Date/Time    LIPASE 12 (L) 01/01/2021 0822    LIPASE 17 04/04/2015 1917       Radiology:  XR Abdomen Flat & Upright   Final Result       As described.               This report was finalized on 1/2/2021 10:32 AM by Dr. Angelo Nash M.D.          CT Abdomen Pelvis With Contrast   Final Result       Small bowel obstruction.       Discussed by telephone with Dr. Conroy at 1017, 01/01/2021.       This report was finalized on 1/1/2021 10:21 AM by Dr. Angelo Nash M.D.              Assessment/Plan   Patient Active Problem List   Diagnosis   • Degeneration of lumbar intervertebral disc   • Small bowel obstruction (CMS/HCC)   • History of Crohn's disease   • HTN (hypertension)       Assessment:  1. Small bowel obstruction  2. Crohn's disease of the small bowel and colon      Plan:  · Check Humira antibody levels and Humira drug levels  · Given his active symptoms preceding the small bowel obstruction, will start IV steroids.  I suspect that his obstructive issue is more likely related to scar tissue than inflammation but if there is any inflammatory component, the steroids will certainly aid in resolution of the acute process.  · Continue clear liquids for now-we will continue to follow for improvement in his symptoms      I discussed the patients findings and my recommendations with patient.    Raquel Sanchez,  MD

## 2021-01-02 NOTE — PROGRESS NOTES
Name: Jeronimo Dai ADMIT: 2021   : 1965  PCP: Andria Wolfe APRN    MRN: [id number] LOS: 1 days   AGE/SEX: 55 y.o. male  ROOM: [facility]/     Subjective   Subjective   Feeling better this morning.  Abdominal pain decreased.  Seeing some flatus denies vomiting having some nausea.  Chest pain shortness of air    Review of Systems   As above  Objective   Objective   Vital Signs  Temp:  [97.7 °F (36.5 °C)-98.9 °F (37.2 °C)] 98.8 °F (37.1 °C)  Heart Rate:  [] 102  Resp:  [16] 16  BP: (110-133)/(72-89) 133/89  SpO2:  [92 %-98 %] 92 %  on   ;   Device (Oxygen Therapy): room air  Body mass index is 24.41 kg/m².  Physical Exam  Constitutional:       General: He is not in acute distress.     Appearance: Normal appearance.   HENT:      Head: Normocephalic and atraumatic.      Nose: Nose normal.      Mouth/Throat:      Pharynx: Oropharynx is clear.   Eyes:      General: No scleral icterus.     Extraocular Movements: Extraocular movements intact.   Neck:      Musculoskeletal: Neck supple.   Cardiovascular:      Rate and Rhythm: Normal rate and regular rhythm.      Pulses: Normal pulses.      Heart sounds: Normal heart sounds.   Pulmonary:      Effort: Pulmonary effort is normal.      Breath sounds: Normal breath sounds.   Abdominal:      General: Abdomen is flat.      Palpations: Abdomen is soft. There is no mass.      Tenderness: There is abdominal tenderness. There is no guarding or rebound.   Musculoskeletal: Normal range of motion.   Skin:     General: Skin is warm and dry.      Capillary Refill: Capillary refill takes less than 2 seconds.   Neurological:      General: No focal deficit present.      Mental Status: He is alert and oriented to person, place, and time.   Psychiatric:         Mood and Affect: Mood normal.         Results Review     I reviewed the patient's new clinical results.  Results from last 7 days   Lab Units 21  0822   WBC 10*3/mm3 10.36   HEMOGLOBIN g/dL 15.4   PLATELETS  10*3/mm3 214     Results from last 7 days   Lab Units 01/01/21  0822   SODIUM mmol/L 137   POTASSIUM mmol/L 3.8   CHLORIDE mmol/L 101   CO2 mmol/L 25.8   BUN mg/dL 8   CREATININE mg/dL 0.84   GLUCOSE mg/dL 155*   Estimated Creatinine Clearance: 112 mL/min (by C-G formula based on SCr of 0.84 mg/dL).  Results from last 7 days   Lab Units 01/01/21  0822   ALBUMIN g/dL 4.30   BILIRUBIN mg/dL 0.9   ALK PHOS U/L 75   AST (SGOT) U/L 15   ALT (SGPT) U/L 13     Results from last 7 days   Lab Units 01/01/21  0822   CALCIUM mg/dL 8.6   ALBUMIN g/dL 4.30       SARS-CoV-2 PCR   Date Value Ref Range Status   12/29/2020 Not Detected Not Detected Final     Comment:     Nucleic acid specific to SARS-CoV-2 (COVID-19) virus was not detected in  this sample by the TaqPath (TM) COVID-19 Combo Kit.          SARS-CoV-2 (COVID-19) nucleic acid testing performed using [employer] Aptima (R) SARS-CoV-2 Assay or [employer] TaqPath (TM)  COVID-19 Combo Kit as indicated above under Emergency Use Authorization (EUA) from the FDA. Aptima (R) and TaqPath (TM) test performance  characteristics verified by [facility] in accordance with the FDAs Guidance Document (Policy for Diagnostic Tests for Coronavirus Disease-2019  during the Public Health Emergency) issued on March 16, 2020. The laboratory is regulated under CLIA as qualified to perform high-complexity testing  Unless otherwise noted, all testing was performed at [facility], CLIA No. [id number], [place] State Licensee No. [id number]     No results found for: HGBA1C, POCGLU    XR Abdomen Flat & Upright  Narrative: XR ABDOMEN FLAT AND UPRIGHT-     INDICATIONS: Small bowel obstruction.     TECHNIQUE: Supine and upright views of the abdomen     COMPARISON:  image from CT from 01/01/2021     FINDINGS:     The stomach is gas-distended. Abnormal distention of multiple small  bowel loops is again demonstrated, compatible with small bowel  obstruction, continued close follow-up  recommended. Overall appearance  is similar to the prior exam. A small amount of gas is apparent in the  rectum. No free air is noted under the diaphragm.     Impression:    As described.           This report was finalized on 1/2/2021 10:32 AM by Dr. Angelo Nash M.D.       Scheduled Medications  metoprolol succinate XL, 50 mg, Oral, Daily  pantoprazole, 40 mg, Intravenous, Q AM  sodium chloride, 10 mL, Intravenous, Q12H    Infusions  sodium chloride, 125 mL/hr, Last Rate: 125 mL/hr (01/01/21 2033)    Diet  Diet Clear Liquid       Assessment/Plan     Active Hospital Problems    Diagnosis  POA   • Small bowel obstruction (CMS/HCC) [K56.609]  Yes   • History of Crohn's disease [Z87.19]  Not Applicable   • HTN (hypertension) [I10]  Unknown   • Degeneration of lumbar intervertebral disc [M51.36]  Yes      Resolved Hospital Problems   No resolved problems to display.       55 y.o. male admitted with <principal problem not specified>.  Abdominal pain/bowel obstruction likely partial/Crohn's disease: Continue conservative management patient is not having any vomiting not require placement of an NG tube.  Surgery is following.  Continue antiemetics venous fluids and analgesics.  GI is to see later this afternoon.  · SCDs for DVT prophylaxis.  · Full code.  · Discussed with patient and consulting provider.  · Anticipate discharge home in 2-3 days.      Rosendo Elizalde MD  [facility]  01/02/21  12:43 EST    Patient was wearing facemask when I entered the room and throughout our encounter.  I wore protective equipment throughout this patient encounter including a face mask, gloves and protective eyewear.  Hand hygiene was performed before donning protective equipment and after removal when leaving the room.

## 2021-01-02 NOTE — PROGRESS NOTES
Chief complaint: Follow-up small bowel obstruction    Subjective:  Feels somewhat better today.  Reports passing some flatus but still with some bloating.  Still having some pain although it seems better.  Mild nausea, no appetite.    Review of systems:  Constitutional: Positive for decreased appetite, weakness, negative for fever  Respiratory: Negative for cough or wheezing    Physical exam:  Patient is afebrile, heart rate 100 blood pressure 133/89  General: Awake alert and oriented, no distress  Eyes: Extraocular movements are intact, no icterus  Neck: Supple, trachea midline  Respiratory: No use of accessory muscles, good bilateral chest expansion  Gastrointestinal: Abdomen is soft, there is mild distention.  There is no guarding, no hernia    Films are reviewed.  Although there is some gas in the colon, there are still multiple dilated small bowel loops    Assessment and plan:  -Small bowel obstruction, favor partial, is passing some flatus and no significant abdominal pain  -Would leave it clears for now, follow clinically, no plans for surgical intervention at the moment    Trent Morejon MD  General and Endoscopic Surgery  [facility]    [address], [address]  [place], [place], [address]  P: [phone]  F: [phone]

## 2021-01-02 NOTE — PLAN OF CARE
Goal Outcome Evaluation:  Plan of Care Reviewed With: patient  Progress: improving  Outcome Summary: Pt PRN pain controlled with IV pain medication. NS @125 continued. Up ad mial. Pt on clear liquids, one bout of emesis, Dr. Elizalde notified. Gave order for compazine and phenergan to help control nausea. Dr. Elizalde says he may need an NG tube if vomiting continues, no episodes since. Zofran x2, Compazine x1 with good effect. Pt ambulated throughout the day. Will continue to monitor pain, nausea, and vital signs throughout the shift. Pt had a bowel movement at 1650.

## 2021-01-02 NOTE — PLAN OF CARE
Goal Outcome Evaluation:  Plan of Care Reviewed With: patient  Progress: improving  Outcome Summary: Pain controlled with IV pain meds about every 2 hours. IV fluids continue. Up ad mila. Ambulated 400 feet this shift. Vomit x 2 overnight. Minimal nausea. Zofran x 1. Continue to monitor.

## 2021-01-03 LAB
ANION GAP SERPL CALCULATED.3IONS-SCNC: 8 MMOL/L (ref 5–15)
BASOPHILS # BLD AUTO: 0.01 10*3/MM3 (ref 0–0.2)
BASOPHILS NFR BLD AUTO: 0.1 % (ref 0–1.5)
BUN SERPL-MCNC: 11 MG/DL (ref 6–20)
BUN/CREAT SERPL: 19.3 (ref 7–25)
CALCIUM SPEC-SCNC: 7.5 MG/DL (ref 8.6–10.5)
CHLORIDE SERPL-SCNC: 106 MMOL/L (ref 98–107)
CO2 SERPL-SCNC: 23 MMOL/L (ref 22–29)
CREAT SERPL-MCNC: 0.57 MG/DL (ref 0.76–1.27)
DEPRECATED RDW RBC AUTO: 42.8 FL (ref 37–54)
EOSINOPHIL # BLD AUTO: 0 10*3/MM3 (ref 0–0.4)
EOSINOPHIL NFR BLD AUTO: 0 % (ref 0.3–6.2)
ERYTHROCYTE [DISTWIDTH] IN BLOOD BY AUTOMATED COUNT: 13 % (ref 12.3–15.4)
GFR SERPL CREATININE-BSD FRML MDRD: 147 ML/MIN/1.73
GLUCOSE SERPL-MCNC: 118 MG/DL (ref 65–99)
HCT VFR BLD AUTO: 33.5 % (ref 37.5–51)
HGB BLD-MCNC: 11.3 G/DL (ref 13–17.7)
IMM GRANULOCYTES # BLD AUTO: 0.02 10*3/MM3 (ref 0–0.05)
IMM GRANULOCYTES NFR BLD AUTO: 0.3 % (ref 0–0.5)
LYMPHOCYTES # BLD AUTO: 0.54 10*3/MM3 (ref 0.7–3.1)
LYMPHOCYTES NFR BLD AUTO: 7.9 % (ref 19.6–45.3)
MAGNESIUM SERPL-MCNC: 1.7 MG/DL (ref 1.6–2.6)
MCH RBC QN AUTO: 30.9 PG (ref 26.6–33)
MCHC RBC AUTO-ENTMCNC: 33.7 G/DL (ref 31.5–35.7)
MCV RBC AUTO: 91.5 FL (ref 79–97)
MONOCYTES # BLD AUTO: 0.33 10*3/MM3 (ref 0.1–0.9)
MONOCYTES NFR BLD AUTO: 4.8 % (ref 5–12)
NEUTROPHILS NFR BLD AUTO: 5.93 10*3/MM3 (ref 1.7–7)
NEUTROPHILS NFR BLD AUTO: 86.9 % (ref 42.7–76)
NRBC BLD AUTO-RTO: 0 /100 WBC (ref 0–0.2)
PLATELET # BLD AUTO: 160 10*3/MM3 (ref 140–450)
PMV BLD AUTO: 11.7 FL (ref 6–12)
POTASSIUM SERPL-SCNC: 4 MMOL/L (ref 3.5–5.2)
RBC # BLD AUTO: 3.66 10*6/MM3 (ref 4.14–5.8)
SODIUM SERPL-SCNC: 137 MMOL/L (ref 136–145)
WBC # BLD AUTO: 6.83 10*3/MM3 (ref 3.4–10.8)

## 2021-01-03 PROCEDURE — 25010000002 METHYLPREDNISOLONE PER 40 MG: Performed by: INTERNAL MEDICINE

## 2021-01-03 PROCEDURE — 25010000002 PROCHLORPERAZINE 10 MG/2ML SOLUTION: Performed by: INTERNAL MEDICINE

## 2021-01-03 PROCEDURE — 80048 BASIC METABOLIC PNL TOTAL CA: CPT | Performed by: INTERNAL MEDICINE

## 2021-01-03 PROCEDURE — 99232 SBSQ HOSP IP/OBS MODERATE 35: CPT | Performed by: INTERNAL MEDICINE

## 2021-01-03 PROCEDURE — 80145 DRUG ASSAY ADALIMUMAB: CPT | Performed by: INTERNAL MEDICINE

## 2021-01-03 PROCEDURE — 25010000002 HYDROMORPHONE PER 4 MG: Performed by: INTERNAL MEDICINE

## 2021-01-03 PROCEDURE — 83735 ASSAY OF MAGNESIUM: CPT | Performed by: INTERNAL MEDICINE

## 2021-01-03 PROCEDURE — 99232 SBSQ HOSP IP/OBS MODERATE 35: CPT | Performed by: SURGERY

## 2021-01-03 PROCEDURE — 25010000002 ONDANSETRON PER 1 MG: Performed by: NURSE PRACTITIONER

## 2021-01-03 PROCEDURE — 85025 COMPLETE CBC W/AUTO DIFF WBC: CPT | Performed by: INTERNAL MEDICINE

## 2021-01-03 PROCEDURE — 82397 CHEMILUMINESCENT ASSAY: CPT | Performed by: INTERNAL MEDICINE

## 2021-01-03 RX ORDER — CLONAZEPAM 0.5 MG/1
0.5 TABLET ORAL 2 TIMES DAILY PRN
Status: DISCONTINUED | OUTPATIENT
Start: 2021-01-03 | End: 2021-01-04 | Stop reason: HOSPADM

## 2021-01-03 RX ORDER — ZOLPIDEM TARTRATE 5 MG/1
5 TABLET ORAL ONCE
Status: COMPLETED | OUTPATIENT
Start: 2021-01-03 | End: 2021-01-03

## 2021-01-03 RX ORDER — CALCIUM CARBONATE 500(1250)
500 TABLET ORAL DAILY
Status: DISCONTINUED | OUTPATIENT
Start: 2021-01-03 | End: 2021-01-04 | Stop reason: HOSPADM

## 2021-01-03 RX ORDER — GABAPENTIN 300 MG/1
300 CAPSULE ORAL 2 TIMES DAILY
Status: DISCONTINUED | OUTPATIENT
Start: 2021-01-03 | End: 2021-01-04 | Stop reason: HOSPADM

## 2021-01-03 RX ADMIN — ZOLPIDEM TARTRATE 5 MG: 5 TABLET, FILM COATED ORAL at 21:24

## 2021-01-03 RX ADMIN — GABAPENTIN 300 MG: 300 CAPSULE ORAL at 20:24

## 2021-01-03 RX ADMIN — METOPROLOL SUCCINATE 50 MG: 50 TABLET, EXTENDED RELEASE ORAL at 08:28

## 2021-01-03 RX ADMIN — DULOXETINE HYDROCHLORIDE 90 MG: 60 CAPSULE, DELAYED RELEASE ORAL at 11:18

## 2021-01-03 RX ADMIN — GABAPENTIN 300 MG: 300 CAPSULE ORAL at 11:18

## 2021-01-03 RX ADMIN — HYDROMORPHONE HYDROCHLORIDE 0.5 MG: 1 INJECTION, SOLUTION INTRAMUSCULAR; INTRAVENOUS; SUBCUTANEOUS at 20:23

## 2021-01-03 RX ADMIN — SODIUM CHLORIDE, PRESERVATIVE FREE 10 ML: 5 INJECTION INTRAVENOUS at 08:29

## 2021-01-03 RX ADMIN — SODIUM CHLORIDE, PRESERVATIVE FREE 10 ML: 5 INJECTION INTRAVENOUS at 20:24

## 2021-01-03 RX ADMIN — HYDROMORPHONE HYDROCHLORIDE 0.5 MG: 1 INJECTION, SOLUTION INTRAMUSCULAR; INTRAVENOUS; SUBCUTANEOUS at 00:35

## 2021-01-03 RX ADMIN — METHYLPREDNISOLONE SODIUM SUCCINATE 20 MG: 40 INJECTION, POWDER, FOR SOLUTION INTRAMUSCULAR; INTRAVENOUS at 20:23

## 2021-01-03 RX ADMIN — HYDROMORPHONE HYDROCHLORIDE 0.5 MG: 1 INJECTION, SOLUTION INTRAMUSCULAR; INTRAVENOUS; SUBCUTANEOUS at 05:05

## 2021-01-03 RX ADMIN — PROCHLORPERAZINE EDISYLATE 10 MG: 5 INJECTION INTRAMUSCULAR; INTRAVENOUS at 14:32

## 2021-01-03 RX ADMIN — ONDANSETRON 4 MG: 2 INJECTION INTRAMUSCULAR; INTRAVENOUS at 00:35

## 2021-01-03 RX ADMIN — PANTOPRAZOLE SODIUM 40 MG: 40 INJECTION, POWDER, FOR SOLUTION INTRAVENOUS at 05:05

## 2021-01-03 RX ADMIN — CALCIUM 500 MG: 500 TABLET ORAL at 11:18

## 2021-01-03 RX ADMIN — HYDROMORPHONE HYDROCHLORIDE 0.5 MG: 1 INJECTION, SOLUTION INTRAMUSCULAR; INTRAVENOUS; SUBCUTANEOUS at 14:32

## 2021-01-03 RX ADMIN — METHYLPREDNISOLONE SODIUM SUCCINATE 20 MG: 40 INJECTION, POWDER, FOR SOLUTION INTRAMUSCULAR; INTRAVENOUS at 05:05

## 2021-01-03 RX ADMIN — METHYLPREDNISOLONE SODIUM SUCCINATE 20 MG: 40 INJECTION, POWDER, FOR SOLUTION INTRAMUSCULAR; INTRAVENOUS at 11:18

## 2021-01-03 RX ADMIN — SODIUM CHLORIDE 125 ML/HR: 9 INJECTION, SOLUTION INTRAVENOUS at 05:06

## 2021-01-03 NOTE — PLAN OF CARE
Goal Outcome Evaluation:  Plan of Care Reviewed With: patient  Progress: improving  Outcome Summary: Pt rested well throughout the shift. No episodes of emesis. Pt had a bowel movement today and has bowel sounds throughout his abdomen. Pt advanced to full liquid diet today, he had some nausea after lunch that was relieved with IV Compazine. IV Dilaudid given for abdominal pain x1 with good effect. Pt up and ambulating. IV steroids and IV fluids NS @ 75 continued. Will continue to monitor vital signs and comfort throughout the shift.

## 2021-01-03 NOTE — PLAN OF CARE
Goal Outcome Evaluation:  Plan of Care Reviewed With: patient  Progress: no change  Outcome Summary: Continues to c/o abd pain. Pain med given frequently for pain control. VSS on room. N/S infusing as ordered. C/o nausea but no vomitting. Up ad mila. Currently resting in bed. will continue to monitor.

## 2021-01-03 NOTE — PROGRESS NOTES
Name: Jeronimo Dai ADMIT: 2021   : 1965  PCP: Andria Wolfe APRN    MRN: [id number] LOS: 2 days   AGE/SEX: 55 y.o. male  ROOM: [facility]/     Subjective   Subjective   Patient reports feeling better this morning.  Tolerating a clear liquid diet.  Passing flatus has had several small to moderate bowel movements    Review of Systems   Constitutional: Negative.    Respiratory: Negative.    Cardiovascular: Negative.    Gastrointestinal: Negative.         Objective   Objective   Vital Signs  Temp:  [97.8 °F (36.6 °C)-100.4 °F (38 °C)] 97.8 °F (36.6 °C)  Heart Rate:  [100-107] 103  Resp:  [13-16] 16  BP: (103-143)/(72-93) 134/85  SpO2:  [92 %-98 %] 95 %  on   ;   Device (Oxygen Therapy): room air  Body mass index is 24.41 kg/m².  Physical Exam  Constitutional:       General: He is not in acute distress.     Appearance: Normal appearance.   HENT:      Head: Normocephalic and atraumatic.      Nose: Nose normal.      Mouth/Throat:      Pharynx: Oropharynx is clear.   Eyes:      General: No scleral icterus.     Extraocular Movements: Extraocular movements intact.   Neck:      Musculoskeletal: Neck supple.   Cardiovascular:      Rate and Rhythm: Normal rate and regular rhythm.      Pulses: Normal pulses.      Heart sounds: Normal heart sounds.   Pulmonary:      Effort: Pulmonary effort is normal.      Breath sounds: Normal breath sounds.   Abdominal:      General: Abdomen is flat.      Palpations: Abdomen is soft. There is no mass.      Tenderness: There is abdominal tenderness. There is no guarding or rebound.   Musculoskeletal: Normal range of motion.   Skin:     General: Skin is warm and dry.      Capillary Refill: Capillary refill takes less than 2 seconds.   Neurological:      General: No focal deficit present.      Mental Status: He is alert and oriented to person, place, and time.   Psychiatric:         Mood and Affect: Mood normal.   Results Review     I reviewed the patient's new clinical  results.  Results from last 7 days   Lab Units 01/03/21  0657 01/01/21  0822   WBC 10*3/mm3 6.83 10.36   HEMOGLOBIN g/dL 11.3* 15.4   PLATELETS 10*3/mm3 160 214     Results from last 7 days   Lab Units 01/03/21  0657 01/01/21  0822   SODIUM mmol/L 137 137   POTASSIUM mmol/L 4.0 3.8   CHLORIDE mmol/L 106 101   CO2 mmol/L 23.0 25.8   BUN mg/dL 11 8   CREATININE mg/dL 0.57* 0.84   GLUCOSE mg/dL 118* 155*   Estimated Creatinine Clearance: 165 mL/min (A) (by C-G formula based on SCr of 0.57 mg/dL (L)).  Results from last 7 days   Lab Units 01/01/21  0822   ALBUMIN g/dL 4.30   BILIRUBIN mg/dL 0.9   ALK PHOS U/L 75   AST (SGOT) U/L 15   ALT (SGPT) U/L 13     Results from last 7 days   Lab Units 01/03/21  0657 01/01/21  0822   CALCIUM mg/dL 7.5* 8.6   ALBUMIN g/dL  --  4.30   MAGNESIUM mg/dL 1.7  --        SARS-CoV-2 PCR   Date Value Ref Range Status   12/29/2020 Not Detected Not Detected Final     Comment:     Nucleic acid specific to SARS-CoV-2 (COVID-19) virus was not detected in  this sample by the TaqPath (TM) COVID-19 Combo Kit.          SARS-CoV-2 (COVID-19) nucleic acid testing performed using [employer] Aptima (R) SARS-CoV-2 Assay or [employer] TaqPath (TM)  COVID-19 Combo Kit as indicated above under Emergency Use Authorization (EUA) from the FDA. Aptima (R) and TaqPath (TM) test performance  characteristics verified by [facility] in accordance with the FDAs Guidance Document (Policy for Diagnostic Tests for Coronavirus Disease-2019  during the Public Health Emergency) issued on March 16, 2020. The laboratory is regulated under CLIA as qualified to perform high-complexity testing  Unless otherwise noted, all testing was performed at [facility], CLIA No. [id number], [place] State Licensee No. [id number]     No results found for: HGBA1C, POCGLU    XR Abdomen Flat & Upright  Narrative: XR ABDOMEN FLAT AND UPRIGHT-     INDICATIONS: Small bowel obstruction.     TECHNIQUE: Supine and upright views of the  abdomen     COMPARISON:  image from CT from 01/01/2021     FINDINGS:     The stomach is gas-distended. Abnormal distention of multiple small  bowel loops is again demonstrated, compatible with small bowel  obstruction, continued close follow-up recommended. Overall appearance  is similar to the prior exam. A small amount of gas is apparent in the  rectum. No free air is noted under the diaphragm.     Impression:    As described.           This report was finalized on 1/2/2021 10:32 AM by Dr. Angelo Nash M.D.       Scheduled Medications  calcium carbonate (oyster shell), 500 mg, Oral, Daily  DULoxetine, 90 mg, Oral, Daily  gabapentin, 300 mg, Oral, BID  methylPREDNISolone sodium succinate, 20 mg, Intravenous, Q8H  metoprolol succinate XL, 50 mg, Oral, Daily  pantoprazole, 40 mg, Intravenous, Q AM  sodium chloride, 10 mL, Intravenous, Q12H    Infusions  sodium chloride, 75 mL/hr, Last Rate: 75 mL/hr (01/03/21 1015)    Diet  Diet Full Liquid       Assessment/Plan     Active Hospital Problems    Diagnosis  POA   • Small bowel obstruction (CMS/HCC) [K56.609]  Yes   • History of Crohn's disease [Z87.19]  Not Applicable   • HTN (hypertension) [I10]  Unknown   • Degeneration of lumbar intervertebral disc [M51.36]  Yes      Resolved Hospital Problems   No resolved problems to display.       55 y.o. male admitted with <principal problem not specified>.    · Abdominal pain/partial small bowel obstruction/longstanding Crohn's disease: Clinically patient is much improved.  General surgery and GI are both following.  Diet advanced to full liquid today.  GI started steroids 1-21.  · Hypertension: Blood pressures well controlled  · SCDs for DVT prophylaxis.  · Full code.  · Discussed with patient, nursing staff and consulting provider.  · Anticipate discharge home later this week.      Rosendo Elizalde MD  [facility]  01/03/21  13:03 EST    Patient was wearing facemask when I entered the room and  throughout our encounter.  I wore protective equipment throughout this patient encounter including a face mask, gloves and protective eyewear.  Hand hygiene was performed before donning protective equipment and after removal when leaving the room.

## 2021-01-03 NOTE — PROGRESS NOTES
Chief complaint: Follow-up small bowel obstruction     Subjective:  Continues to improve.  Pain is improved.  He has had a total of 3 bowel movements over the last 24 hours, the last one being more solid.  Some mild nausea but tolerating clear liquids better this morning.     Review of systems:  Constitutional: Positive for decreased appetite, weakness, negative for fever  Respiratory: Negative for cough or wheezing     Physical exam:  Temperature 98.6 heart rate 100 blood pressure 143/87  General: Awake alert and oriented, no distress  Eyes: Extraocular movements are intact, no icterus  Neck: Supple, trachea midline  Respiratory: No use of accessory muscles, good bilateral chest expansion  Gastrointestinal: Abdomen is soft, distention appears much improved.  There is no guarding, no hernia        Assessment and plan:  -Small bowel obstruction, favor partial, clinically improving, now with several bowel movements  -Steroids started yesterday, difficult to say whether this is responsible for the improvement, but he is clinically better  -Advance to full liquids today  -No plans for any surgical intervention at this time     Trent Morejon MD  General and Endoscopic Surgery  [facility]     [address], [address]  [place], [place], [address]  P: [phone]  F: [phone]

## 2021-01-03 NOTE — PROGRESS NOTES
[facility]  Inpatient Progress Note    Reason for Follow Up:  Small bowel obstruction status post colonoscopy, history of Crohn's    Subjective     Interval History:   Feels better overall.  Has had several bowel movements today.  Seems to be tolerating full liquids quite well.  Less abdominal distention.  A little indigestion but this is fairly well controlled with pantoprazole.    Current Facility-Administered Medications:   •  calcium carbonate (oyster shell) tablet 500 mg, 500 mg, Oral, Daily, Rsoendo Elizalde MD, 500 mg at 01/03/21 1118  •  clonazePAM (KlonoPIN) tablet 0.5 mg, 0.5 mg, Oral, BID PRN, Rosendo Elizalde MD  •  DULoxetine (CYMBALTA) DR capsule 90 mg, 90 mg, Oral, Daily, Rosendo Elizalde MD, 90 mg at 01/03/21 1118  •  gabapentin (NEURONTIN) capsule 300 mg, 300 mg, Oral, BID, Rosendo Elizalde MD, 300 mg at 01/03/21 1118  •  HYDROmorphone (DILAUDID) injection 0.5 mg, 0.5 mg, Intravenous, Q2H PRN, Rosendo Elizalde MD, 0.5 mg at 01/03/21 1432  •  Magnesium Sulfate 2 gram Bolus, followed by 8 gram infusion (total Mg dose 10 grams)- Mg less than or equal to 1mg/dL, 2 g, Intravenous, PRN **OR** Magnesium Sulfate 2 gram / 50mL Infusion (GIVE X 3 BAGS TO EQUAL 6GM TOTAL DOSE) - Mg 1.1 - 1.5 mg/dl, 2 g, Intravenous, PRN **OR** Magnesium Sulfate 4 gram infusion- Mg 1.6-1.9 mg/dL, 4 g, Intravenous, PRN, Rosendo Elizalde MD  •  methylPREDNISolone sodium succinate (SOLU-Medrol) injection 20 mg, 20 mg, Intravenous, Q8H, Raquel Sanchez MD, 20 mg at 01/03/21 1118  •  metoprolol succinate XL (TOPROL-XL) 24 hr tablet 50 mg, 50 mg, Oral, Daily, Елена Bradford, APRN, 50 mg at 01/03/21 0828  •  ondansetron (ZOFRAN) injection 4 mg, 4 mg, Intravenous, Q6H PRN, Елена Bradford, APRN, 4 mg at 01/03/21 0035  •  pantoprazole (PROTONIX) injection 40 mg, 40 mg, Intravenous, Q AM, Rosendo Elizalde MD, 40 mg at 01/03/21 0505  •  potassium chloride (K-DUR,KLOR-CON) ER tablet 40 mEq, 40 mEq,  Oral, PRN, Rosendo Elizalde MD  •  potassium chloride (KLOR-CON) packet 40 mEq, 40 mEq, Oral, PRN, Rosendo Elizalde MD  •  prochlorperazine (COMPAZINE) injection 10 mg, 10 mg, Intravenous, Q6H PRN, Rosendo Elizalde MD, 10 mg at 01/03/21 1432  •  promethazine (PHENERGAN) 12.5 mg in sodium chloride 0.9 % 50 mL, 12.5 mg, Intravenous, Q6H PRN, Rosendo Elizalde MD  •  sodium chloride 0.9 % flush 10 mL, 10 mL, Intravenous, Q12H, Елена Bradford APRN, 10 mL at 01/03/21 0829  •  sodium chloride 0.9 % flush 10 mL, 10 mL, Intravenous, PRN, Елена rBadford APRN  •  sodium chloride 0.9 % infusion, 75 mL/hr, Intravenous, Continuous, Trent Morejon MD, Last Rate: 75 mL/hr at 01/03/21 1015, 75 mL/hr at 01/03/21 1015  Review of Systems:    Positive for low-grade fever, negative for chills, negative for abdominal pain or vomiting    Objective     Vital Signs  Temp:  [97.8 °F (36.6 °C)-100.4 °F (38 °C)] 98 °F (36.7 °C)  Heart Rate:  [100-107] 101  Resp:  [13-16] 16  BP: (103-143)/(72-93) 131/79  Body mass index is 24.41 kg/m².    Intake/Output Summary (Last 24 hours) at 1/3/2021 1723  Last data filed at 1/3/2021 1126  Gross per 24 hour   Intake 4526 ml   Output --   Net 4526 ml     I/O this shift:  In: 2239 [P.O.:240; I.V.:1999]  Out: -      Physical Exam:   General: patient awake, alert and cooperative   Eyes: Normal lids and lashes, no scleral icterus   Neck: supple, normal ROM   Skin: warm and dry, not jaundiced   Pulm:  regular and unlabored   Abdomen: soft, nontender, distention improved; normal bowel sounds   Extremities: no rash or edema   Psychiatric: Normal mood and behavior; memory intact     Results Review:     I reviewed the patient's new clinical results.    Results from last 7 days   Lab Units 01/03/21  0657 01/01/21  0822   WBC 10*3/mm3 6.83 10.36   HEMOGLOBIN g/dL 11.3* 15.4   HEMATOCRIT % 33.5* 45.2   PLATELETS 10*3/mm3 160 214     Results from last 7 days   Lab Units 01/03/21  0657 01/01/21  0822    SODIUM mmol/L 137 137   POTASSIUM mmol/L 4.0 3.8   CHLORIDE mmol/L 106 101   CO2 mmol/L 23.0 25.8   BUN mg/dL 11 8   CREATININE mg/dL 0.57* 0.84   CALCIUM mg/dL 7.5* 8.6   BILIRUBIN mg/dL  --  0.9   ALK PHOS U/L  --  75   ALT (SGPT) U/L  --  13   AST (SGOT) U/L  --  15   GLUCOSE mg/dL 118* 155*     Results from last 7 days   Lab Units 01/01/21  0823   INR  1.07     Lab Results   Lab Value Date/Time    LIPASE 12 (L) 01/01/2021 0822    LIPASE 17 04/04/2015 1917       Radiology:  XR Abdomen Flat & Upright   Final Result       As described.               This report was finalized on 1/2/2021 10:32 AM by Dr. Angelo Nash M.D.          CT Abdomen Pelvis With Contrast   Final Result       Small bowel obstruction.       Discussed by telephone with Dr. Conroy at 1017, 01/01/2021.       This report was finalized on 1/1/2021 10:21 AM by Dr. Angelo Nash M.D.              Assessment/Plan     Patient Active Problem List   Diagnosis   • Degeneration of lumbar intervertebral disc   • Small bowel obstruction (CMS/HCC)   • History of Crohn's disease   • HTN (hypertension)       Assessment:  1. Small bowel obstruction  2. Crohn's disease of the small bowel and colon         Plan:  · Follow-up on Humira antibody and drug levels  · Continue IV steroids-clinically improving  · Tolerating liquid diet-advance as tolerated to low residue  · Dr. Ruby will follow up in the morning    I discussed the patients findings and my recommendations with patient.    Raquel Sanchez MD

## 2021-01-04 ENCOUNTER — INPATIENT HOSPITAL (OUTPATIENT)
Dept: URBAN - METROPOLITAN AREA HOSPITAL 113 | Facility: HOSPITAL | Age: 56
End: 2021-01-04

## 2021-01-04 VITALS
OXYGEN SATURATION: 100 % | BODY MASS INDEX: 24.38 KG/M2 | WEIGHT: 180 LBS | SYSTOLIC BLOOD PRESSURE: 136 MMHG | TEMPERATURE: 98.4 F | DIASTOLIC BLOOD PRESSURE: 84 MMHG | RESPIRATION RATE: 16 BRPM | HEIGHT: 72 IN | HEART RATE: 92 BPM

## 2021-01-04 DIAGNOSIS — K50.00 CROHN'S DISEASE OF SMALL INTESTINE WITHOUT COMPLICATIONS: ICD-10-CM

## 2021-01-04 DIAGNOSIS — Z79.899 OTHER LONG TERM (CURRENT) DRUG THERAPY: ICD-10-CM

## 2021-01-04 DIAGNOSIS — K56.609 UNSPECIFIED INTESTINAL OBSTRUCTION, UNSPECIFIED AS TO PARTIA: ICD-10-CM

## 2021-01-04 DIAGNOSIS — I10 ESSENTIAL (PRIMARY) HYPERTENSION: ICD-10-CM

## 2021-01-04 LAB
ANION GAP SERPL CALCULATED.3IONS-SCNC: 7.6 MMOL/L (ref 5–15)
BUN SERPL-MCNC: 10 MG/DL (ref 6–20)
BUN/CREAT SERPL: 15.9 (ref 7–25)
CALCIUM SPEC-SCNC: 8.2 MG/DL (ref 8.6–10.5)
CHLORIDE SERPL-SCNC: 103 MMOL/L (ref 98–107)
CO2 SERPL-SCNC: 26.4 MMOL/L (ref 22–29)
CREAT SERPL-MCNC: 0.63 MG/DL (ref 0.76–1.27)
DEPRECATED RDW RBC AUTO: 42.4 FL (ref 37–54)
ERYTHROCYTE [DISTWIDTH] IN BLOOD BY AUTOMATED COUNT: 12.9 % (ref 12.3–15.4)
GFR SERPL CREATININE-BSD FRML MDRD: 131 ML/MIN/1.73
GLUCOSE SERPL-MCNC: 126 MG/DL (ref 65–99)
HCT VFR BLD AUTO: 35.8 % (ref 37.5–51)
HGB BLD-MCNC: 12 G/DL (ref 13–17.7)
LAB AP CASE REPORT: NORMAL
MCH RBC QN AUTO: 30.3 PG (ref 26.6–33)
MCHC RBC AUTO-ENTMCNC: 33.5 G/DL (ref 31.5–35.7)
MCV RBC AUTO: 90.4 FL (ref 79–97)
PATH REPORT.FINAL DX SPEC: NORMAL
PATH REPORT.GROSS SPEC: NORMAL
PLATELET # BLD AUTO: 181 10*3/MM3 (ref 140–450)
PMV BLD AUTO: 11.7 FL (ref 6–12)
POTASSIUM SERPL-SCNC: 3.6 MMOL/L (ref 3.5–5.2)
RBC # BLD AUTO: 3.96 10*6/MM3 (ref 4.14–5.8)
SODIUM SERPL-SCNC: 137 MMOL/L (ref 136–145)
WBC # BLD AUTO: 7.17 10*3/MM3 (ref 3.4–10.8)

## 2021-01-04 PROCEDURE — 25010000002 HYDROMORPHONE PER 4 MG: Performed by: INTERNAL MEDICINE

## 2021-01-04 PROCEDURE — 25010000002 ONDANSETRON PER 1 MG: Performed by: NURSE PRACTITIONER

## 2021-01-04 PROCEDURE — 25010000002 METHYLPREDNISOLONE PER 40 MG: Performed by: INTERNAL MEDICINE

## 2021-01-04 PROCEDURE — 80048 BASIC METABOLIC PNL TOTAL CA: CPT | Performed by: INTERNAL MEDICINE

## 2021-01-04 PROCEDURE — 99233 SBSQ HOSP IP/OBS HIGH 50: CPT | Performed by: INTERNAL MEDICINE

## 2021-01-04 PROCEDURE — 85027 COMPLETE CBC AUTOMATED: CPT | Performed by: INTERNAL MEDICINE

## 2021-01-04 PROCEDURE — 99232 SBSQ HOSP IP/OBS MODERATE 35: CPT | Performed by: SURGERY

## 2021-01-04 PROCEDURE — 63710000001 PREDNISONE PER 1 MG: Performed by: INTERNAL MEDICINE

## 2021-01-04 RX ORDER — HYDROCODONE BITARTRATE AND ACETAMINOPHEN 5; 325 MG/1; MG/1
1 TABLET ORAL EVERY 6 HOURS PRN
Qty: 9 TABLET | Refills: 0 | Status: CANCELLED | OUTPATIENT
Start: 2021-01-04 | End: 2021-01-07

## 2021-01-04 RX ORDER — PANTOPRAZOLE SODIUM 40 MG/1
40 TABLET, DELAYED RELEASE ORAL
Status: DISCONTINUED | OUTPATIENT
Start: 2021-01-04 | End: 2021-01-04 | Stop reason: HOSPADM

## 2021-01-04 RX ORDER — HYDROCODONE BITARTRATE AND ACETAMINOPHEN 5; 325 MG/1; MG/1
1 TABLET ORAL EVERY 8 HOURS PRN
Qty: 9 TABLET | Refills: 0 | Status: SHIPPED | OUTPATIENT
Start: 2021-01-04 | End: 2021-01-07

## 2021-01-04 RX ORDER — PREDNISONE 1 MG/1
TABLET ORAL
Qty: 36 TABLET | Refills: 0 | Status: CANCELLED | OUTPATIENT
Start: 2021-01-04 | End: 2021-03-01

## 2021-01-04 RX ORDER — HYDROCODONE BITARTRATE AND ACETAMINOPHEN 5; 325 MG/1; MG/1
1 TABLET ORAL EVERY 6 HOURS PRN
Status: DISCONTINUED | OUTPATIENT
Start: 2021-01-04 | End: 2021-01-04 | Stop reason: HOSPADM

## 2021-01-04 RX ORDER — PREDNISONE 1 MG/1
TABLET ORAL
Qty: 244 TABLET | Refills: 0 | Status: CANCELLED | OUTPATIENT
Start: 2021-01-04 | End: 2021-02-28

## 2021-01-04 RX ORDER — PREDNISONE 20 MG/1
40 TABLET ORAL
Status: DISCONTINUED | OUTPATIENT
Start: 2021-01-04 | End: 2021-01-04 | Stop reason: HOSPADM

## 2021-01-04 RX ADMIN — METOPROLOL SUCCINATE 50 MG: 50 TABLET, EXTENDED RELEASE ORAL at 09:13

## 2021-01-04 RX ADMIN — SODIUM CHLORIDE 75 ML/HR: 9 INJECTION, SOLUTION INTRAVENOUS at 05:28

## 2021-01-04 RX ADMIN — ONDANSETRON 4 MG: 2 INJECTION INTRAMUSCULAR; INTRAVENOUS at 09:11

## 2021-01-04 RX ADMIN — PANTOPRAZOLE SODIUM 40 MG: 40 INJECTION, POWDER, FOR SOLUTION INTRAVENOUS at 05:18

## 2021-01-04 RX ADMIN — PANTOPRAZOLE SODIUM 40 MG: 40 TABLET, DELAYED RELEASE ORAL at 10:02

## 2021-01-04 RX ADMIN — METHYLPREDNISOLONE SODIUM SUCCINATE 20 MG: 40 INJECTION, POWDER, FOR SOLUTION INTRAMUSCULAR; INTRAVENOUS at 05:18

## 2021-01-04 RX ADMIN — PREDNISONE 40 MG: 20 TABLET ORAL at 10:02

## 2021-01-04 RX ADMIN — CALCIUM 500 MG: 500 TABLET ORAL at 09:12

## 2021-01-04 RX ADMIN — HYDROMORPHONE HYDROCHLORIDE 0.5 MG: 1 INJECTION, SOLUTION INTRAMUSCULAR; INTRAVENOUS; SUBCUTANEOUS at 09:11

## 2021-01-04 RX ADMIN — POTASSIUM CHLORIDE 40 MEQ: 750 TABLET, EXTENDED RELEASE ORAL at 12:53

## 2021-01-04 RX ADMIN — DULOXETINE HYDROCHLORIDE 90 MG: 60 CAPSULE, DELAYED RELEASE ORAL at 09:15

## 2021-01-04 RX ADMIN — POTASSIUM CHLORIDE 40 MEQ: 750 TABLET, EXTENDED RELEASE ORAL at 09:13

## 2021-01-04 RX ADMIN — GABAPENTIN 300 MG: 300 CAPSULE ORAL at 09:13

## 2021-01-04 NOTE — PAYOR COMM NOTE
"Jeronimo Dai (55 y.o. Male)                 ATTENTION;  IDALIA MORENO LPN, NOTIFICATION OF PATIENT ADMISSION, REPLY TO UR DEPT,               NELSON PIPER LPN  [phone] OR CALL          Date of Birth Social Security Number Address Home Phone MRN    1965  [id number]  [place] [address] [phone] [id number]    Congregation Marital Status          Patient Refused Single       Admission Date Admission Type Admitting Provider Attending Provider Department, Room/Bed    1/1/21 Emergency Rosendo Elizalde MD Reddy, Rahul Kandada, MD [address], P484/1    Discharge Date Discharge Disposition Discharge Destination         Home or Self Care              Attending Provider: Booker Salomon MD    Allergies: Augmentin [Amoxicillin-pot Clavulanate]    Isolation: None   Infection: None   Code Status: CPR    Ht: 182.9 cm (72\")   Wt: 81.6 kg (180 lb)    Admission Cmt: None   Principal Problem: None                Active Insurance as of 1/1/2021     Primary Coverage     Payor Plan Insurance Group Employer/Plan Group    [id number] [place] [facility] [id number] [facility]      Payor Plan Address Payor Plan Phone Number Payor Plan Fax Number Effective Dates    [address] [address]   2/1/2019 - None Entered    [place] [address]       Subscriber Name Subscriber Birth Date Member ID       JERONIMO DAI 1965 [id number]                 Emergency Contacts      (Rel.) Home Phone Work Phone Mobile Phone    Charlette Funk (Friend) [phone] -- --               History & Physical      Елена Bradford APRN at 01/01/21 1155     Attestation signed by Rosendo Elizalde MD at 01/01/21 1711    Mr. Dai is a 55-year-old gentleman with a long history of Crohn's disease, hypertension.  He has had small bowel obstructions in the past but none in the past 6 years.  He reports that he underwent colonoscopy yesterday.  States that he was having some pink abdominal " pain may be a little more usually post procedure he reports passing flatus and having small bowel movements.  However this morning the pain increased and he began having nausea and vomiting.  He denies any hematemesis melena or bright red blood per rectum.  Denies any fevers or chills shortness of air chest discomfort    General: Alert pleasant gentleman lying flat in bed in no acute hemodynamic distress  HEENT exam: Normocephalic atraumatic sclerae clear oropharynx is clear  Neck: Supple without adenopathy JVD or bruits  Lungs: Clear to auscultation with good air exchange  Cardiovascular: Regular rate and rhythm with normal S1 and S2  Abdomen: Slight distention diffuse tenderness no guarding or rebound I do not appreciate any bowel sounds  Extremities: Warm and well perfused without rash or edema moves all extremities well  Neurologic: Awake alert oriented x3 no focal deficits moves extremities  Psychiatric: Very pleasant    Abdominal pain/small bowel obstruction/long history of Crohn's disease: Patient's pain proved after receiving Dilaudid.  We will admitted pain control antiemetics intravenous fluids.  Consult general surgery and GI.                      Patient Name:  Jeronimo Dai  YOB: 1965  MRN:  [id number]  Admit Date:  1/1/2021  Patient Care Team:  Andria Wolfe APRN as PCP - General (Nurse Practitioner)  Skip Marley MD as Consulting Physician (Pain Medicine)  Drew Taylor MD as Surgeon (Neurosurgery)  Sarthak Ruby MD as Consulting Physician (Gastroenterology)      Subjective   History Present Illness     Chief Complaint   Patient presents with   • Abdominal Pain       Mr. Dai is a 55 y.o. male never smoker with a history of Crohn's disease and hypertension that presents to [facility] complaining of abdominal pain and vomiting which began yesterday mid afternoon after his routine colonoscopy procedure.  Patient reports having a history of Crohn's  disease for which he has regular colonoscopies.  He is familiar with a usual gas pain after colonoscopy.  Yesterday the gas pain was more intense, did not ease up and worsened this morning when his pain became more sharp and more typical in nature of a Crohn's flare.  He vomited once this morning, there was no obvious blood.  He denies any rectal bleeding.  No BM since prep for colonoscopy.  No fever, chills or diaphoresis.  No shortness of breath or chest pain.    Prior to yesterday patient had been feeling well.  No known exposure to COVID-19 or any sick persons.    Reported medical history includes hypertension, Crohn's disease, multiple colonoscopies, appendectomy and right hemicolectomy.  He has been followed by Dr. Ruby for his Crohn's disease for many years.     Review of Systems   Constitutional: Negative for activity change, appetite change, chills, diaphoresis, fatigue, fever and unexpected weight change.   HENT: Negative for congestion and sore throat.    Eyes: Negative for discharge and visual disturbance.   Respiratory: Negative for cough, chest tightness and shortness of breath.    Cardiovascular: Negative for chest pain, palpitations and leg swelling.   Gastrointestinal: Positive for abdominal distention, abdominal pain, nausea and rectal pain. Negative for anal bleeding and blood in stool.   Genitourinary: Negative for difficulty urinating and dysuria.   Musculoskeletal: Negative for arthralgias and myalgias.   Skin: Negative for rash and wound.   Neurological: Negative for dizziness, weakness, light-headedness and headaches.   Psychiatric/Behavioral: Negative for confusion and decreased concentration.        Personal History     Past Medical History:   Diagnosis Date   • Anxiety    • Crohn's disease (CMS/HCC)    • DDD (degenerative disc disease), lumbosacral    • Depression with anxiety    • Hypertension    • Insomnia    • Neuropathy      Past Surgical History:   Procedure Laterality Date   •  APPENDECTOMY     • CHOLECYSTECTOMY     • COLONOSCOPY  2018   • HEMICOLECTOMY Bilateral     X2   • LUMBAR DISC SURGERY     • LUMBAR LAMINECTOMY DISCECTOMY DECOMPRESSION N/A 3/9/2017    Procedure: OPEN REVISION L5/S1 DISCECTOMY POSTERIOR AND L4-5 RIGHT;  Surgeon: Drew Taylor MD;  Location: [facility] OR;  Service:      Family History   Problem Relation Age of Onset   • Coronary artery disease Mother    • Heart failure Father    • Dementia Father    • Malig Hyperthermia Neg Hx      Social History     Tobacco Use   • Smoking status: Never Smoker   • Smokeless tobacco: Never Used   Substance Use Topics   • Alcohol use: Yes     Comment: socially   • Drug use: Yes     Types: Hydrocodone     Current Facility-Administered Medications on File Prior to Encounter   Medication Dose Route Frequency Provider Last Rate Last Admin   • [DISCONTINUED] lactated ringers infusion 1,000 mL  1,000 mL Intravenous Continuous Sarthak Ruby MD   Stopped at 12/31/20 1040     Current Outpatient Medications on File Prior to Encounter   Medication Sig Dispense Refill   • Adalimumab (HUMIRA PEN-CROHNS STARTER) 40 MG/0.8ML Pen-injector Kit Inject 1 each under the skin into the appropriate area as directed Every 14 (Fourteen) Days.     • Calcium Citrate-Vitamin D (CALCIUM + D PO) Take 1 tablet by mouth Daily.     • Chlorpheniramine Maleate (ALLERGY PO) Take 1 tablet by mouth Daily As Needed.     • clonazePAM (KlonoPIN) 0.5 MG tablet Take 0.5 mg by mouth 3 (Three) Times a Day As Needed.     • cyanocobalamin 1000 MCG/ML injection 1,000 mcg Every 28 (Twenty-Eight) Days.     • cyclobenzaprine (FLEXERIL) 10 MG tablet Take 10 mg by mouth 4 (Four) Times a Day.     • dicyclomine (BENTYL) 20 MG tablet Take 20 mg by mouth Every 6 (Six) Hours As Needed.     • diphenhydrAMINE (BENADRYL) 25 mg capsule Take 25 mg by mouth Every 6 (Six) Hours As Needed for Allergies.     • diphenoxylate-atropine (LOMOTIL) 2.5-0.025 MG per tablet Take 1 tablet by mouth 4  (Four) Times a Day As Needed for diarrhea.     • DULoxetine (CYMBALTA) 60 MG capsule Take 90 mg by mouth Daily.     • gabapentin (NEURONTIN) 300 MG capsule TAKE 1 CAPSULE BY MOUTH 2 TIMES A DAY (Patient taking differently: Take 300 mg by mouth 3 (Three) Times a Day.) 180 capsule 0   • metoprolol succinate XL (TOPROL-XL) 50 MG 24 hr tablet Take 50 mg by mouth Daily.     • omeprazole (priLOSEC) 20 MG capsule Take 20 mg by mouth Daily.     • promethazine (PHENERGAN) 25 MG tablet Take 25 mg by mouth Every 6 (Six) Hours As Needed.     • zolpidem (AMBIEN) 10 MG tablet Take 10 mg by mouth At Night As Needed.     • azithromycin (ZITHROMAX Z-DENISE) 250 MG tablet Take 2 tablets the first day, then 1 tablet daily for 4 days. 6 tablet 0   • HYDROcodone-acetaminophen (NORCO) 7.5-325 MG per tablet Take 2 tablets by mouth Every 4 (Four) Hours As Needed for moderate pain (4-6).  0     Allergies   Allergen Reactions   • Augmentin [Amoxicillin-Pot Clavulanate] Other (See Comments) and Diarrhea     gi upset only not allergic  AVOIDS DUE TO CROHNS       Objective    Objective     Vital Signs  Temp:  [96.6 °F (35.9 °C)-97.6 °F (36.4 °C)] 97.6 °F (36.4 °C)  Heart Rate:  [] 97  Resp:  [16-18] 16  BP: (104-120)/(74-82) 120/81  SpO2:  [95 %-99 %] 97 %  on   ;   Device (Oxygen Therapy): room air  Body mass index is 24.41 kg/m².    Physical Exam  Constitutional:       General: He is not in acute distress.     Appearance: Normal appearance. He is ill-appearing. He is not toxic-appearing.   HENT:      Head: Normocephalic and atraumatic.      Nose: Nose normal.      Mouth/Throat:      Mouth: Mucous membranes are moist.   Eyes:      General:         Right eye: No discharge.         Left eye: No discharge.      Extraocular Movements: Extraocular movements intact.      Conjunctiva/sclera: Conjunctivae normal.   Neck:      Musculoskeletal: Normal range of motion and neck supple.   Cardiovascular:      Rate and Rhythm: Normal rate and regular  rhythm.      Pulses: Normal pulses.      Heart sounds: Normal heart sounds. No murmur.   Pulmonary:      Effort: No respiratory distress.      Breath sounds: Normal breath sounds.   Abdominal:      General: There is distension.      Palpations: Abdomen is soft. There is no mass.      Tenderness: There is abdominal tenderness.      Comments: Hypoactive BS   Musculoskeletal: Normal range of motion.         General: No swelling or tenderness.   Skin:     General: Skin is warm and dry.      Coloration: Skin is not jaundiced.   Neurological:      General: No focal deficit present.      Mental Status: He is alert and oriented to person, place, and time.   Psychiatric:         Mood and Affect: Mood normal.         Results Review:  I reviewed the patient's new clinical results.  I reviewed the patient's new imaging results and agree with the interpretation.      Lab Results (last 24 hours)     Procedure Component Value Units Date/Time    CBC & Differential [[id number]]  (Abnormal) Collected: 01/01/21 0822    Specimen: Blood Updated: 01/01/21 0841    Narrative:      The following orders were created for panel order CBC & Differential.  Procedure                               Abnormality         Status                     ---------                               -----------         ------                     CBC Auto Differential[[id number]]        Abnormal            Final result                 Please view results for these tests on the individual orders.    Comprehensive Metabolic Panel [[id number]]  (Abnormal) Collected: 01/01/21 0822    Specimen: Blood Updated: 01/01/21 0933     Glucose 155 mg/dL      BUN 8 mg/dL      Creatinine 0.84 mg/dL      Sodium 137 mmol/L      Potassium 3.8 mmol/L      Comment: Slight hemolysis detected by analyzer. Results may be affected.        Chloride 101 mmol/L      CO2 25.8 mmol/L      Calcium 8.6 mg/dL      Total Protein 7.6 g/dL      Albumin 4.30 g/dL      ALT (SGPT) 13 U/L      AST (SGOT) 15  U/L      Alkaline Phosphatase 75 U/L      Total Bilirubin 0.9 mg/dL      eGFR Non African Amer 94 mL/min/1.73      Globulin 3.3 gm/dL      A/G Ratio 1.3 g/dL      BUN/Creatinine Ratio 9.5     Anion Gap 10.2 mmol/L     Narrative:      GFR Normal >60  Chronic Kidney Disease <60  Kidney Failure <15      Lipase [[id number]]  (Abnormal) Collected: 01/01/21 0822    Specimen: Blood Updated: 01/01/21 0901     Lipase 12 U/L     CBC Auto Differential [[id number]]  (Abnormal) Collected: 01/01/21 0822    Specimen: Blood Updated: 01/01/21 0841     WBC 10.36 10*3/mm3      RBC 5.04 10*6/mm3      Hemoglobin 15.4 g/dL      Hematocrit 45.2 %      MCV 89.7 fL      MCH 30.6 pg      MCHC 34.1 g/dL      RDW 13.0 %      RDW-SD 41.9 fl      MPV 11.3 fL      Platelets 214 10*3/mm3      Neutrophil % 82.6 %      Lymphocyte % 11.2 %      Monocyte % 5.3 %      Eosinophil % 0.2 %      Basophil % 0.3 %      Immature Grans % 0.4 %      Neutrophils, Absolute 8.56 10*3/mm3      Lymphocytes, Absolute 1.16 10*3/mm3      Monocytes, Absolute 0.55 10*3/mm3      Eosinophils, Absolute 0.02 10*3/mm3      Basophils, Absolute 0.03 10*3/mm3      Immature Grans, Absolute 0.04 10*3/mm3      nRBC 0.0 /100 WBC     Protime-INR [[id number]]  (Normal) Collected: 01/01/21 0823    Specimen: Blood Updated: 01/01/21 0848     Protime 13.7 Seconds      INR 1.07    Urinalysis With Microscopic If Indicated (No Culture) - Urine, Clean Catch [[id number]]  (Abnormal) Collected: 01/01/21 0838    Specimen: Urine, Clean Catch Updated: 01/01/21 0857     Color, UA Dark Yellow     Appearance, UA Turbid     pH, UA 6.0     Specific Gravity, UA 1.015     Glucose, UA Negative     Ketones, UA Trace     Bilirubin, UA Negative     Blood, UA Negative     Protein, UA 30 mg/dL (1+)     Leuk Esterase, UA Negative     Nitrite, UA Negative     Urobilinogen, UA 1.0 E.U./dL    Urinalysis, Microscopic Only - Urine, Clean Catch [[id number]]  (Abnormal) Collected: 01/01/21 0838    Specimen: Urine, Clean  Catch Updated: 01/01/21 0911     RBC, UA None Seen /HPF      WBC, UA 3-5 /HPF      Bacteria, UA None Seen /HPF      Squamous Epithelial Cells, UA 0-2 /HPF      Hyaline Casts, UA 7-12 /LPF      Calcium Oxalate Crystals, UA Large/3+ /HPF      Mucus, UA Moderate/2+ /HPF      Methodology Manual Light Microscopy          Imaging Results (Last 24 Hours)     Procedure Component Value Units Date/Time    CT Abdomen Pelvis With Contrast [[id number]] Collected: 01/01/21 1017     Updated: 01/01/21 1024    Narrative:      CT ABDOMEN PELVIS W CONTRAST-     INDICATIONS: Pain, history of bowel obstruction     TECHNIQUE: Radiation dose reduction techniques were utilized, including  automated exposure control and exposure modulation based on body size.  Enhanced ABDOMEN AND PELVIS CT     COMPARISON: 04/04/2015     FINDINGS:     The proximal small bowel is abnormally dilated and fluid-filled with  transition to collapsed caliber in the right aspect of the abdomen,  compatible small bowel obstruction. Proximal right colectomy change is  apparent.           The gallbladder is surgically absent.     Pancreatic head is fatty infiltrated.     A subcentimeter right renal low density is too small to characterize.     Otherwise unremarkable appearance of the liver, spleen, adrenal glands,  pancreas, kidneys, bladder.                 No free intraperitoneal gas. Mild pelvic free fluid.     Scattered small mesenteric and para-aortic lymph nodes are seen that are  not significant by size criteria.     Abdominal aorta is not aneurysmal. Aortic and other arterial  calcifications are present.     The lung bases show small atelectasis, trace pleural effusions.  Degenerative changes are seen in the spine. No acute fracture is  identified.             Impression:         Small bowel obstruction.     Discussed by telephone with Dr. Conroy at 1017, 01/01/2021.     This report was finalized on 1/1/2021 10:21 AM by Dr. Angelo Nash M.D.                   No orders to display        Assessment/Plan     Active Hospital Problems    Diagnosis  POA   • Small bowel obstruction (CMS/HCC) [K56.609]  Yes   • History of Crohn's disease [Z87.19]  Not Applicable   • HTN (hypertension) [I10]  Unknown   • Degeneration of lumbar intervertebral disc [M51.36]  Yes      Resolved Hospital Problems   No resolved problems to display.     Mr. Dai is a 55 y.o. male never smoker with a history of Crohn's disease and hypertension that presents to [facility] complaining of abdominal pain and vomiting which began yesterday mid afternoon after his routine colonoscopy procedure    Small bowel obstruction/history of Crohn's disease  Dilated and fluid-filled small bowel evident on CT scan.  WBC normal.  Chemistries unremarkable.  Lipase 12.  Treat pain and nausea.  Discussed with patient caution with balance between pain control and opioids hindering resolution of bowel obstruction.  Continue IV fluids.  Keep NPO.    Consult general surgery.  No NG tube for now, will defer to surgery.  One episode of vomiting only.  Abdomen slightly distended and tender.  Consult Dr. Sarthak Ruby for Crohn's management.    Hypertension  Readings stable.  Continue only home dose metoprolol for now.    · I discussed the patient's findings and my recommendations with patient and Dr. Elizalde.    VTE Prophylaxis - SCDs.  Code Status - Full code.       ITZ Chopra  [facility]  01/01/21  12:46 EST    Electronically signed by Rosendo Elizalde MD at 01/01/21 6644

## 2021-01-04 NOTE — DISCHARGE SUMMARY
Patient Name: Jeronimo Dai  : 1965  MRN: [id number]    Date of Admission: 2021  Date of Discharge:  2021  Primary Care Physician: Andria Wolfe APRN      Chief Complaint:   Abdominal Pain      Discharge Diagnoses     Active Hospital Problems    Diagnosis  POA   • Small bowel obstruction (CMS/HCC) [K56.609]  Yes   • History of Crohn's disease [Z87.19]  Not Applicable   • HTN (hypertension) [I10]  Unknown   • Degeneration of lumbar intervertebral disc [M51.36]  Yes      Resolved Hospital Problems   No resolved problems to display.        Hospital Course     Mr. aDi is a 55 y.o. male with a history of neuropathy, insomnia, hypertension, degenerative disc disease, lumbosacral, Crohn's disease, depression/anxiety who presented to [facility] initially complaining of abdominal pain.  Please see the admitting history and physical for further details.  He was found to have small bowel obstruction and was admitted to the hospital for further evaluation and treatment.      General surgery and gastroenterology consulted for partial small bowel extraction noted overall clinical resolution without surgical intervention possibly improved status post initiation of steroids.  Patient tolerated diet advancement and gastroenterology and general surgery approved discharge on 2021 with oral prednisone.    Day of Discharge     Subjective:  Patient appears relatively comfortable and in no apparent distress.  Tolerating p.o.  Voices no new concerns.  Agrees with discharge home on 2021 and follow-up as previously discussed.    Review of Systems   Constitutional: Negative for chills and fever.   HENT: Negative for congestion and rhinorrhea.    Respiratory: Negative for cough and shortness of breath.    Cardiovascular: Negative for chest pain and leg swelling.   Gastrointestinal: Positive for abdominal pain (very mild tenderness throughout abdomen). Negative for constipation, diarrhea,  nausea (improved s/p zofran) and vomiting.   Endocrine: Negative for polydipsia, polyphagia and polyuria.   Genitourinary: Negative for difficulty urinating and dysuria.   Musculoskeletal: Negative for back pain and myalgias.   Skin: Negative for rash and wound.   Neurological: Negative for dizziness and weakness.   Psychiatric/Behavioral: Negative for confusion and hallucinations.       Physical Exam:  Temp:  [97.5 °F (36.4 °C)-98.4 °F (36.9 °C)] 98.4 °F (36.9 °C)  Heart Rate:  [] 92  Resp:  [16] 16  BP: (127-150)/(79-95) 136/84  Body mass index is 24.41 kg/m².     Physical Exam  Constitutional:       General: He is not in acute distress.     Appearance: He is not ill-appearing.   HENT:      Head: Normocephalic and atraumatic.   Eyes:      Extraocular Movements: Extraocular movements intact.      Conjunctiva/sclera: Conjunctivae normal.   Neck:      Musculoskeletal: Normal range of motion and neck supple.   Cardiovascular:      Rate and Rhythm: Normal rate.      Heart sounds: Normal heart sounds.   Pulmonary:      Effort: Pulmonary effort is normal.      Breath sounds: Normal breath sounds.   Abdominal:      General: Bowel sounds are normal.      Palpations: Abdomen is soft.      Tenderness: There is abdominal tenderness (very mild tenderness of generalized abdomen).   Musculoskeletal:      Right lower leg: No edema.      Left lower leg: No edema.   Skin:     General: Skin is warm and dry.   Neurological:      Mental Status: He is alert and oriented to person, place, and time.      Cranial Nerves: No cranial nerve deficit.   Psychiatric:         Behavior: Behavior normal.         Thought Content: Thought content normal.         Consultants     Consult Orders (all) (From admission, onward)     Start     Ordered    01/02/21 0629  Inpatient Gastroenterology Consult  Once     Specialty:  Gastroenterology  Provider:  Pierre Murphy MD    01/02/21 0629    01/01/21 1155  Inpatient Gastroenterology Consult  Once,    Status:  Canceled     Specialty:  Gastroenterology  Provider:  Sarthak Ruby MD    01/01/21 1155    01/01/21 1155  Inpatient General Surgery Consult  Once     Specialty:  General Surgery  Provider:  Trent Morejon MD    01/01/21 1155    01/01/21 1039  [facility] (on-call MD unless specified) Details  Once     Specialty:  Hospitalist  Provider:  (Not yet assigned)    01/01/21 1038    01/01/21 1025  Surgery (on-call MD unless specified)  Once     Specialty:  General Surgery  Provider:  Trent Morejon MD    01/01/21 1024              Procedures     Imaging Results (All)     Procedure Component Value Units Date/Time    XR Abdomen Flat & Upright [[id number]] Collected: 01/02/21 1030     Updated: 01/02/21 1035    Narrative:      XR ABDOMEN FLAT AND UPRIGHT-     INDICATIONS: Small bowel obstruction.     TECHNIQUE: Supine and upright views of the abdomen     COMPARISON:  image from CT from 01/01/2021     FINDINGS:     The stomach is gas-distended. Abnormal distention of multiple small  bowel loops is again demonstrated, compatible with small bowel  obstruction, continued close follow-up recommended. Overall appearance  is similar to the prior exam. A small amount of gas is apparent in the  rectum. No free air is noted under the diaphragm.       Impression:         As described.           This report was finalized on 1/2/2021 10:32 AM by Dr. Angelo Nash M.D.       CT Abdomen Pelvis With Contrast [[id number]] Collected: 01/01/21 1017     Updated: 01/01/21 1024    Narrative:      CT ABDOMEN PELVIS W CONTRAST-     INDICATIONS: Pain, history of bowel obstruction     TECHNIQUE: Radiation dose reduction techniques were utilized, including  automated exposure control and exposure modulation based on body size.  Enhanced ABDOMEN AND PELVIS CT     COMPARISON: 04/04/2015     FINDINGS:     The proximal small bowel is abnormally dilated and fluid-filled with  transition to collapsed caliber in the right aspect of the  abdomen,  compatible small bowel obstruction. Proximal right colectomy change is  apparent.           The gallbladder is surgically absent.     Pancreatic head is fatty infiltrated.     A subcentimeter right renal low density is too small to characterize.     Otherwise unremarkable appearance of the liver, spleen, adrenal glands,  pancreas, kidneys, bladder.                 No free intraperitoneal gas. Mild pelvic free fluid.     Scattered small mesenteric and para-aortic lymph nodes are seen that are  not significant by size criteria.     Abdominal aorta is not aneurysmal. Aortic and other arterial  calcifications are present.     The lung bases show small atelectasis, trace pleural effusions.  Degenerative changes are seen in the spine. No acute fracture is  identified.             Impression:         Small bowel obstruction.     Discussed by telephone with Dr. Conroy at 1017, 01/01/2021.     This report was finalized on 1/1/2021 10:21 AM by Dr. Angelo Nash M.D.             Pertinent Labs     Results from last 7 days   Lab Units 01/04/21  0659 01/03/21  0657 01/01/21  0822   WBC 10*3/mm3 7.17 6.83 10.36   HEMOGLOBIN g/dL 12.0* 11.3* 15.4   PLATELETS 10*3/mm3 181 160 214     Results from last 7 days   Lab Units 01/04/21  0659 01/03/21  0657 01/01/21  0822   SODIUM mmol/L 137 137 137   POTASSIUM mmol/L 3.6 4.0 3.8   CHLORIDE mmol/L 103 106 101   CO2 mmol/L 26.4 23.0 25.8   BUN mg/dL 10 11 8   CREATININE mg/dL 0.63* 0.57* 0.84   GLUCOSE mg/dL 126* 118* 155*   Estimated Creatinine Clearance: 149.3 mL/min (A) (by C-G formula based on SCr of 0.63 mg/dL (L)).  Results from last 7 days   Lab Units 01/01/21  0822   ALBUMIN g/dL 4.30   BILIRUBIN mg/dL 0.9   ALK PHOS U/L 75   AST (SGOT) U/L 15   ALT (SGPT) U/L 13     Results from last 7 days   Lab Units 01/04/21  0659 01/03/21  0657 01/01/21  0822   CALCIUM mg/dL 8.2* 7.5* 8.6   ALBUMIN g/dL  --   --  4.30   MAGNESIUM mg/dL  --  1.7  --      Results from last 7 days    Lab Units 01/01/21  0822   LIPASE U/L 12*             Invalid input(s): LDLCALC        Test Results Pending at Discharge     Pending Labs     Order Current Status    Adalimumab and Anti-Adalimumab Antibody In process          Discharge Details        Discharge Medications      New Medications      Instructions Start Date   HYDROcodone-acetaminophen 5-325 MG per tablet  Commonly known as: NORCO  Replaces: HYDROcodone-acetaminophen 7.5-325 MG per tablet   1 tablet, Oral, Every 8 Hours PRN      predniSONE 5 MG tablet  Commonly known as: DELTASONE   TAKE 8 TABLETS BY MOUTH DAILY STARTING 1/5/21 THEN TAPER BY 1 TABLET EVERY 7 DAYS UNTIL GONE. LAST DOSE ON 2/27/21         Changes to Medications      Instructions Start Date   gabapentin 300 MG capsule  Commonly known as: NEURONTIN  What changed: when to take this   TAKE 1 CAPSULE BY MOUTH 2 TIMES A DAY         Continue These Medications      Instructions Start Date   ALLERGY PO   1 tablet, Oral, Daily PRN      CALCIUM + D PO   1 tablet, Oral, Daily      clonazePAM 0.5 MG tablet  Commonly known as: KlonoPIN   0.5 mg, Oral, 3 Times Daily PRN      cyanocobalamin 1000 MCG/ML injection   1,000 mcg, Every 28 Days      cyclobenzaprine 10 MG tablet  Commonly known as: FLEXERIL   10 mg, Oral, 4 Times Daily      dicyclomine 20 MG tablet  Commonly known as: BENTYL   20 mg, Oral, Every 6 Hours PRN      diphenhydrAMINE 25 mg capsule  Commonly known as: BENADRYL   25 mg, Oral, Every 6 Hours PRN      DULoxetine 60 MG capsule  Commonly known as: CYMBALTA   90 mg, Oral, Daily      Humira Pen-CD/UC/HS Starter 40 MG/0.8ML Pen-injector Kit  Generic drug: Adalimumab   1 each, Subcutaneous, Every 14 Days      Lomotil 2.5-0.025 MG per tablet  Generic drug: diphenoxylate-atropine   1 tablet, Oral, 4 Times Daily PRN      metoprolol succinate XL 50 MG 24 hr tablet  Commonly known as: TOPROL-XL   50 mg, Oral, Daily      omeprazole 20 MG capsule  Commonly known as: priLOSEC   20 mg, Oral, Daily       promethazine 25 MG tablet  Commonly known as: PHENERGAN   25 mg, Oral, Every 6 Hours PRN      zolpidem 10 MG tablet  Commonly known as: AMBIEN   10 mg, Oral, Nightly PRN         Stop These Medications    azithromycin 250 MG tablet  Commonly known as: Zithromax Z-Tyree     HYDROcodone-acetaminophen 7.5-325 MG per tablet  Commonly known as: NORCO  Replaced by: HYDROcodone-acetaminophen 5-325 MG per tablet            Allergies   Allergen Reactions   • Augmentin [Amoxicillin-Pot Clavulanate] Other (See Comments) and Diarrhea     gi upset only not allergic  AVOIDS DUE TO CROHNS         Discharge Disposition:  Home or Self Care    Discharge Diet:  Diet Order   Procedures   • Diet Regular; GI Soft       Discharge Activity:   Activity Instructions     Driving Restrictions      Type of Restriction: Driving    Driving Restrictions: No Driving While Taking Narcotics          CODE STATUS:    Code Status and Medical Interventions:   Ordered at: 01/01/21 1151     Code Status:    CPR     Medical Interventions (Level of Support Prior to Arrest):    Full       No future appointments.  Additional Instructions for the Follow-ups that You Need to Schedule     Discharge Follow-up with PCP   As directed       Currently Documented PCP:    Andria Wolfe APRN    PCP Phone Number:    [phone]     Follow Up Details: Please call to schedule a follow-up appointment as needed.         Discharge Follow-up with Specialty: gastroenterology   As directed      Specialty: gastroenterology    Follow Up Details: Please call to schedule a follow-up appointment with Gastroenterology as previously discussed.           Follow-up Information     Andria Wolfe APRN .    Specialty: Nurse Practitioner  Why: Please call to schedule a follow-up appointment as needed.  Contact information:  [address] [facility] [address]  [place] [address]  [phone]                   Additional Instructions for the Follow-ups that You Need to Schedule     Discharge  Follow-up with PCP   As directed       Currently Documented PCP:    Andria Wolfe APRN    PCP Phone Number:    [phone]     Follow Up Details: Please call to schedule a follow-up appointment as needed.         Discharge Follow-up with Specialty: gastroenterology   As directed      Specialty: gastroenterology    Follow Up Details: Please call to schedule a follow-up appointment with Gastroenterology as previously discussed.           Time Spent on Discharge:  Greater than 30 minutes      ITZ Brandon  [facility]  01/04/21  09:45 EST

## 2021-01-04 NOTE — PAYOR COMM NOTE
"Jeronimo Dai (55 y.o. Male)                   ATTENTION;   DC SUMMARY CASE REF : [id number]        Date of Birth Social Security Number Address Home Phone MRN    1965  [address] [address] [phone] [id number]    Amish Marital Status          Patient Refused Single       Admission Date Admission Type Admitting Provider Attending Provider Department, Room/Bed    1/1/21 Emergency Rosendo Elizalde MD Reddy, Rahul Kandada, MD [address], P484/1    Discharge Date Discharge Disposition Discharge Destination         Home or Self Care              Attending Provider: Booker Salomon MD    Allergies: Augmentin [Amoxicillin-pot Clavulanate]    Isolation: None   Infection: None   Code Status: CPR    Ht: 182.9 cm (72\")   Wt: 81.6 kg (180 lb)    Admission Cmt: None   Principal Problem: None                Active Insurance as of 1/1/2021     Primary Coverage     Payor Plan Insurance Group Employer/Plan Group    [facility]      Payor Plan Address Payor Plan Phone Number Payor Plan Fax Number Effective Dates    [address] [address]   2/1/2019 - None Entered    [place] [address]       Subscriber Name Subscriber Birth Date Member ID       JERONIMO DAI 1965 [id number]                 Emergency Contacts      (Rel.) Home Phone Work Phone Mobile Phone    Charlette Funk (Friend) [phone] -- --               Discharge Summary      Clifton Carter APRN at 01/04/21 0924     Attestation signed by Booker Salomon MD at 01/04/21 9400      Please note nurse practitioner discharge summary, plan has been reviewed and I do concur.  Patient was admitted for a small-bowel obstruction and has known history of Crohn's.  He was treated symptomatically and he started improving.  He was initiated on clear liquid diet and subsequently has been advanced to soft diet and is tolerating quite well.  His bowel function has " also started returning.  He does not have any abdominal pain and is hemodynamically stable.  Given the above he is being discharged home in a stable condition.  Of note patient's small-bowel obstruction was felt to be secondary to Crohn's disease of the small bowel and colon.  Patient has been placed on steroids and he will be on tapered dose upon discharge.  HEENT:  PERRLA, extraocular movements intact.    Cardiovascular:  Regular rate and rhythm with normal S1-S2   GI: Soft, nontender, bowel sounds are present.                      Patient Name: Jeronimo Dai  : 1965  MRN: [id number]    Date of Admission: 2021  Date of Discharge:  2021  Primary Care Physician: Andria Wolfe APRN      Chief Complaint:   Abdominal Pain      Discharge Diagnoses     Active Hospital Problems    Diagnosis  POA   • Small bowel obstruction (CMS/HCC) [K56.609]  Yes   • History of Crohn's disease [Z87.19]  Not Applicable   • HTN (hypertension) [I10]  Unknown   • Degeneration of lumbar intervertebral disc [M51.36]  Yes      Resolved Hospital Problems   No resolved problems to display.        Hospital Course     Mr. Dai is a 55 y.o. male with a history of neuropathy, insomnia, hypertension, degenerative disc disease, lumbosacral, Crohn's disease, depression/anxiety who presented to [facility] initially complaining of abdominal pain.  Please see the admitting history and physical for further details.  He was found to have small bowel obstruction and was admitted to the hospital for further evaluation and treatment.      General surgery and gastroenterology consulted for partial small bowel extraction noted overall clinical resolution without surgical intervention possibly improved status post initiation of steroids.  Patient tolerated diet advancement and gastroenterology and general surgery approved discharge on 2021 with oral prednisone.    Day of Discharge     Subjective:  Patient appears  relatively comfortable and in no apparent distress.  Tolerating p.o.  Voices no new concerns.  Agrees with discharge home on 1/4/2021 and follow-up as previously discussed.    Review of Systems   Constitutional: Negative for chills and fever.   HENT: Negative for congestion and rhinorrhea.    Respiratory: Negative for cough and shortness of breath.    Cardiovascular: Negative for chest pain and leg swelling.   Gastrointestinal: Positive for abdominal pain (very mild tenderness throughout abdomen). Negative for constipation, diarrhea, nausea (improved s/p zofran) and vomiting.   Endocrine: Negative for polydipsia, polyphagia and polyuria.   Genitourinary: Negative for difficulty urinating and dysuria.   Musculoskeletal: Negative for back pain and myalgias.   Skin: Negative for rash and wound.   Neurological: Negative for dizziness and weakness.   Psychiatric/Behavioral: Negative for confusion and hallucinations.       Physical Exam:  Temp:  [97.5 °F (36.4 °C)-98.4 °F (36.9 °C)] 98.4 °F (36.9 °C)  Heart Rate:  [] 92  Resp:  [16] 16  BP: (127-150)/(79-95) 136/84  Body mass index is 24.41 kg/m².     Physical Exam  Constitutional:       General: He is not in acute distress.     Appearance: He is not ill-appearing.   HENT:      Head: Normocephalic and atraumatic.   Eyes:      Extraocular Movements: Extraocular movements intact.      Conjunctiva/sclera: Conjunctivae normal.   Neck:      Musculoskeletal: Normal range of motion and neck supple.   Cardiovascular:      Rate and Rhythm: Normal rate.      Heart sounds: Normal heart sounds.   Pulmonary:      Effort: Pulmonary effort is normal.      Breath sounds: Normal breath sounds.   Abdominal:      General: Bowel sounds are normal.      Palpations: Abdomen is soft.      Tenderness: There is abdominal tenderness (very mild tenderness of generalized abdomen).   Musculoskeletal:      Right lower leg: No edema.      Left lower leg: No edema.   Skin:     General: Skin is  warm and dry.   Neurological:      Mental Status: He is alert and oriented to person, place, and time.      Cranial Nerves: No cranial nerve deficit.   Psychiatric:         Behavior: Behavior normal.         Thought Content: Thought content normal.         Consultants     Consult Orders (all) (From admission, onward)     Start     Ordered    01/02/21 0629  Inpatient Gastroenterology Consult  Once     Specialty:  Gastroenterology  Provider:  Pierre Murphy MD    01/02/21 0629    01/01/21 1155  Inpatient Gastroenterology Consult  Once,   Status:  Canceled     Specialty:  Gastroenterology  Provider:  Sarthak Ruby MD    01/01/21 1155    01/01/21 1155  Inpatient General Surgery Consult  Once     Specialty:  General Surgery  Provider:  Trent Morejon MD    01/01/21 1155    01/01/21 1039  [facility] (on-call MD unless specified) Details  Once     Specialty:  Hospitalist  Provider:  (Not yet assigned)    01/01/21 1038    01/01/21 1025  Surgery (on-call MD unless specified)  Once     Specialty:  General Surgery  Provider:  Trent Morejon MD    01/01/21 1024              Procedures     Imaging Results (All)     Procedure Component Value Units Date/Time    XR Abdomen Flat & Upright [[id number]] Collected: 01/02/21 1030     Updated: 01/02/21 1035    Narrative:      XR ABDOMEN FLAT AND UPRIGHT-     INDICATIONS: Small bowel obstruction.     TECHNIQUE: Supine and upright views of the abdomen     COMPARISON:  image from CT from 01/01/2021     FINDINGS:     The stomach is gas-distended. Abnormal distention of multiple small  bowel loops is again demonstrated, compatible with small bowel  obstruction, continued close follow-up recommended. Overall appearance  is similar to the prior exam. A small amount of gas is apparent in the  rectum. No free air is noted under the diaphragm.       Impression:         As described.           This report was finalized on 1/2/2021 10:32 AM by Dr. Angelo Nash M.D.       CT Abdomen  Pelvis With Contrast [[id number]] Collected: 01/01/21 1017     Updated: 01/01/21 1024    Narrative:      CT ABDOMEN PELVIS W CONTRAST-     INDICATIONS: Pain, history of bowel obstruction     TECHNIQUE: Radiation dose reduction techniques were utilized, including  automated exposure control and exposure modulation based on body size.  Enhanced ABDOMEN AND PELVIS CT     COMPARISON: 04/04/2015     FINDINGS:     The proximal small bowel is abnormally dilated and fluid-filled with  transition to collapsed caliber in the right aspect of the abdomen,  compatible small bowel obstruction. Proximal right colectomy change is  apparent.           The gallbladder is surgically absent.     Pancreatic head is fatty infiltrated.     A subcentimeter right renal low density is too small to characterize.     Otherwise unremarkable appearance of the liver, spleen, adrenal glands,  pancreas, kidneys, bladder.                 No free intraperitoneal gas. Mild pelvic free fluid.     Scattered small mesenteric and para-aortic lymph nodes are seen that are  not significant by size criteria.     Abdominal aorta is not aneurysmal. Aortic and other arterial  calcifications are present.     The lung bases show small atelectasis, trace pleural effusions.  Degenerative changes are seen in the spine. No acute fracture is  identified.             Impression:         Small bowel obstruction.     Discussed by telephone with Dr. Conroy at 1017, 01/01/2021.     This report was finalized on 1/1/2021 10:21 AM by Dr. Angelo Nash M.D.             Pertinent Labs     Results from last 7 days   Lab Units 01/04/21  0659 01/03/21  0657 01/01/21  0822   WBC 10*3/mm3 7.17 6.83 10.36   HEMOGLOBIN g/dL 12.0* 11.3* 15.4   PLATELETS 10*3/mm3 181 160 214     Results from last 7 days   Lab Units 01/04/21  0659 01/03/21  0657 01/01/21  0822   SODIUM mmol/L 137 137 137   POTASSIUM mmol/L 3.6 4.0 3.8   CHLORIDE mmol/L 103 106 101   CO2 mmol/L 26.4 23.0 25.8   BUN  mg/dL 10 11 8   CREATININE mg/dL 0.63* 0.57* 0.84   GLUCOSE mg/dL 126* 118* 155*   Estimated Creatinine Clearance: 149.3 mL/min (A) (by C-G formula based on SCr of 0.63 mg/dL (L)).  Results from last 7 days   Lab Units 01/01/21  0822   ALBUMIN g/dL 4.30   BILIRUBIN mg/dL 0.9   ALK PHOS U/L 75   AST (SGOT) U/L 15   ALT (SGPT) U/L 13     Results from last 7 days   Lab Units 01/04/21  0659 01/03/21  0657 01/01/21  0822   CALCIUM mg/dL 8.2* 7.5* 8.6   ALBUMIN g/dL  --   --  4.30   MAGNESIUM mg/dL  --  1.7  --      Results from last 7 days   Lab Units 01/01/21  0822   LIPASE U/L 12*             Invalid input(s): LDLCALC        Test Results Pending at Discharge     Pending Labs     Order Current Status    Adalimumab and Anti-Adalimumab Antibody In process          Discharge Details        Discharge Medications      New Medications      Instructions Start Date   HYDROcodone-acetaminophen 5-325 MG per tablet  Commonly known as: NORCO  Replaces: HYDROcodone-acetaminophen 7.5-325 MG per tablet   1 tablet, Oral, Every 8 Hours PRN      predniSONE 5 MG tablet  Commonly known as: DELTASONE   TAKE 8 TABLETS BY MOUTH DAILY STARTING 1/5/21 THEN TAPER BY 1 TABLET EVERY 7 DAYS UNTIL GONE. LAST DOSE ON 2/27/21         Changes to Medications      Instructions Start Date   gabapentin 300 MG capsule  Commonly known as: NEURONTIN  What changed: when to take this   TAKE 1 CAPSULE BY MOUTH 2 TIMES A DAY         Continue These Medications      Instructions Start Date   ALLERGY PO   1 tablet, Oral, Daily PRN      CALCIUM + D PO   1 tablet, Oral, Daily      clonazePAM 0.5 MG tablet  Commonly known as: KlonoPIN   0.5 mg, Oral, 3 Times Daily PRN      cyanocobalamin 1000 MCG/ML injection   1,000 mcg, Every 28 Days      cyclobenzaprine 10 MG tablet  Commonly known as: FLEXERIL   10 mg, Oral, 4 Times Daily      dicyclomine 20 MG tablet  Commonly known as: BENTYL   20 mg, Oral, Every 6 Hours PRN      diphenhydrAMINE 25 mg capsule  Commonly known as:  BENADRYL   25 mg, Oral, Every 6 Hours PRN      DULoxetine 60 MG capsule  Commonly known as: CYMBALTA   90 mg, Oral, Daily      Humira Pen-CD/UC/HS Starter 40 MG/0.8ML Pen-injector Kit  Generic drug: Adalimumab   1 each, Subcutaneous, Every 14 Days      Lomotil 2.5-0.025 MG per tablet  Generic drug: diphenoxylate-atropine   1 tablet, Oral, 4 Times Daily PRN      metoprolol succinate XL 50 MG 24 hr tablet  Commonly known as: TOPROL-XL   50 mg, Oral, Daily      omeprazole 20 MG capsule  Commonly known as: priLOSEC   20 mg, Oral, Daily      promethazine 25 MG tablet  Commonly known as: PHENERGAN   25 mg, Oral, Every 6 Hours PRN      zolpidem 10 MG tablet  Commonly known as: AMBIEN   10 mg, Oral, Nightly PRN         Stop These Medications    azithromycin 250 MG tablet  Commonly known as: Zithromax Z-Tyree     HYDROcodone-acetaminophen 7.5-325 MG per tablet  Commonly known as: NORCO  Replaced by: HYDROcodone-acetaminophen 5-325 MG per tablet            Allergies   Allergen Reactions   • Augmentin [Amoxicillin-Pot Clavulanate] Other (See Comments) and Diarrhea     gi upset only not allergic  AVOIDS DUE TO CROHNS         Discharge Disposition:  Home or Self Care    Discharge Diet:  Diet Order   Procedures   • Diet Regular; GI Soft       Discharge Activity:   Activity Instructions     Driving Restrictions      Type of Restriction: Driving    Driving Restrictions: No Driving While Taking Narcotics          CODE STATUS:    Code Status and Medical Interventions:   Ordered at: 01/01/21 1155     Code Status:    CPR     Medical Interventions (Level of Support Prior to Arrest):    Full       No future appointments.  Additional Instructions for the Follow-ups that You Need to Schedule     Discharge Follow-up with PCP   As directed       Currently Documented PCP:    Andria Wolfe APRN    PCP Phone Number:    [phone]     Follow Up Details: Please call to schedule a follow-up appointment as needed.         Discharge Follow-up  with Specialty: gastroenterology   As directed      Specialty: gastroenterology    Follow Up Details: Please call to schedule a follow-up appointment with Gastroenterology as previously discussed.           Follow-up Information     Andria Wolfe APRN .    Specialty: Nurse Practitioner  Why: Please call to schedule a follow-up appointment as needed.  Contact information:  [address] [facility] [address]  [address] [address]  [phone]                   Additional Instructions for the Follow-ups that You Need to Schedule     Discharge Follow-up with PCP   As directed       Currently Documented PCP:    Andria Wolfe APRN    PCP Phone Number:    [phone]     Follow Up Details: Please call to schedule a follow-up appointment as needed.         Discharge Follow-up with Specialty: gastroenterology   As directed      Specialty: gastroenterology    Follow Up Details: Please call to schedule a follow-up appointment with Gastroenterology as previously discussed.           Time Spent on Discharge:  Greater than 30 minutes      ITZ Brandon  [facility]  01/04/21  09:45 EST                Electronically signed by Booker Salomon MD at 01/04/21 4020

## 2021-01-04 NOTE — PLAN OF CARE
Goal Outcome Evaluation:  Plan of Care Reviewed With: patient  Progress: improving  Outcome Summary: Pt rested well. Medicated for pain X1. Pt rested great after ambien at HS. Pt appears in good spirits and may be ready for D/C soon. Continue IV fluids and monitor.

## 2021-01-04 NOTE — PROGRESS NOTES
Chief complaint: Follow-up small bowel obstruction     Subjective:  Feels well.  Had a couple of bowel movements yesterday.  Denies any abdominal pain and now tolerating full liquids.     Review of systems:  Constitutional: Positive for decreased appetite, weakness, negative for fever  Respiratory: Negative for cough or wheezing     Physical exam:  Temperature 97.5 heart rate 95 blood pressure 150/90  General: Awake alert and oriented, no distress  Eyes: Extraocular movements are intact, no icterus  Neck: Supple, trachea midline  Respiratory: No use of accessory muscles, good bilateral chest expansion  Gastrointestinal: Abdomen is soft,  nondistended and completely nontender today        Assessment and plan:  -Small bowel obstruction, clinically resolved at this time  -Steroids started yesterday, difficult to say whether this is responsible for the improvement, but he is clinically better  -Advance to soft diet today  -No plans for any surgical intervention at this time  -If patient tolerates solid food, okay for discharge from surgical standpoint     Trent Morejon MD  General and Endoscopic Surgery  [facility]     [address], [address]  [place], [place], [address]  P: [phone]  F: [phone]

## 2021-01-04 NOTE — PROGRESS NOTES
Case Management Discharge Note      Final Note: Pt discharging home today. No needs identified. Nohemi Mills LCSW         Selected Continued Care - Admitted Since 1/1/2021     Destination    No services have been selected for the patient.              Durable Medical Equipment    No services have been selected for the patient.              Dialysis/Infusion    No services have been selected for the patient.              Home Medical Care    No services have been selected for the patient.              Therapy    No services have been selected for the patient.              Community Resources    No services have been selected for the patient.                  Transportation Services  Private: Car    Final Discharge Disposition Code: 01 - home or self-care

## 2021-01-04 NOTE — PROGRESS NOTES
"   LOS: 3 days   Patient Care Team:  Andria Wolfe APRN as PCP - General (Nurse Practitioner)  Skip Marley MD as Consulting Physician (Pain Medicine)  Drew Taylor MD as Surgeon (Neurosurgery)  Sarthak Ruby MD as Consulting Physician (Gastroenterology)    Chief Complaint: crohns disease    Subjective   Patient is feeling better ,some bowel movements.   History of Present Illness    Subjective:  Symptoms:  Improved.        History taken from: patient chart    Objective     Vital Signs  Temp:  [97.6 °F (36.4 °C)-98.2 °F (36.8 °C)] 97.6 °F (36.4 °C)  Heart Rate:  [] 110  Resp:  [16] 16  BP: (127-146)/(79-95) 127/81    Objective:  General Appearance:  Well-appearing and comfortable.    Vital signs: (most recent): Blood pressure 127/81, pulse 110, temperature 97.6 °F (36.4 °C), temperature source Oral, resp. rate 16, height 182.9 cm (72\"), weight 81.6 kg (180 lb), SpO2 97 %.  Vital signs are normal.    Output: Producing urine.    Lungs:  Normal effort.    Heart: Normal rate.    Abdomen: Abdomen is soft.  There is no abdominal tenderness.     Skin:  Warm and dry.              Results Review:     I reviewed the patient's new clinical results.    Medication Review: done    Assessment/Plan       Degeneration of lumbar intervertebral disc    Small bowel obstruction (CMS/HCC)    History of Crohn's disease    HTN (hypertension)      Assessment:  (Partial small bowel obstruction  crohns disease of small bowel and colon ).     Plan:   (Change to po prednisone  The adaluminab levels and antibodies pending.  Hopefully home soon . ).       Sarthak Ruby MD  01/04/21  08:05 EST    Time: Critical care 25 min      "

## 2021-01-11 LAB
ADALIMUMAB AB SERPL-MCNC: <25 NG/ML
ADALIMUMAB SERPL-MCNC: 2.8 UG/ML

## 2021-03-24 ENCOUNTER — BULK ORDERING (OUTPATIENT)
Dept: CASE MANAGEMENT | Facility: OTHER | Age: 56
End: 2021-03-24

## 2021-03-24 DIAGNOSIS — Z23 IMMUNIZATION DUE: ICD-10-CM

## 2022-04-21 ENCOUNTER — OFFICE (OUTPATIENT)
Dept: URBAN - METROPOLITAN AREA CLINIC 66 | Facility: CLINIC | Age: 57
End: 2022-04-21
Payer: COMMERCIAL

## 2022-04-21 VITALS
DIASTOLIC BLOOD PRESSURE: 70 MMHG | HEIGHT: 72 IN | WEIGHT: 201 LBS | HEART RATE: 80 BPM | SYSTOLIC BLOOD PRESSURE: 113 MMHG

## 2022-04-21 DIAGNOSIS — R14.0 ABDOMINAL DISTENSION (GASEOUS): ICD-10-CM

## 2022-04-21 DIAGNOSIS — R11.0 NAUSEA: ICD-10-CM

## 2022-04-21 DIAGNOSIS — K50.80 CROHN'S DISEASE OF BOTH SMALL AND LARGE INTESTINE WITHOUT CO: ICD-10-CM

## 2022-04-21 DIAGNOSIS — R19.7 DIARRHEA, UNSPECIFIED: ICD-10-CM

## 2022-04-21 PROCEDURE — 99214 OFFICE O/P EST MOD 30 MIN: CPT | Performed by: NURSE PRACTITIONER

## 2022-07-01 ENCOUNTER — OFFICE (OUTPATIENT)
Dept: URBAN - METROPOLITAN AREA CLINIC 65 | Facility: CLINIC | Age: 57
End: 2022-07-01
Payer: COMMERCIAL

## 2022-07-01 VITALS
DIASTOLIC BLOOD PRESSURE: 76 MMHG | WEIGHT: 199 LBS | HEIGHT: 72 IN | SYSTOLIC BLOOD PRESSURE: 110 MMHG | HEART RATE: 94 BPM

## 2022-07-01 DIAGNOSIS — K50.80 CROHN'S DISEASE OF BOTH SMALL AND LARGE INTESTINE WITHOUT CO: ICD-10-CM

## 2022-07-01 PROCEDURE — 99214 OFFICE O/P EST MOD 30 MIN: CPT | Performed by: INTERNAL MEDICINE

## 2022-07-01 RX ORDER — DIPHENOXYLATE HYDROCHLORIDE AND ATROPINE SULFATE 2.5; .025 MG/1; MG/1
TABLET ORAL
Qty: 240 | Refills: 5 | Status: COMPLETED
End: 2023-04-21 | Stop reason: SDUPTHER

## 2022-12-09 ENCOUNTER — OFFICE (OUTPATIENT)
Dept: URBAN - METROPOLITAN AREA CLINIC 65 | Facility: CLINIC | Age: 57
End: 2022-12-09
Payer: COMMERCIAL

## 2022-12-09 VITALS
WEIGHT: 190 LBS | DIASTOLIC BLOOD PRESSURE: 75 MMHG | HEIGHT: 72 IN | SYSTOLIC BLOOD PRESSURE: 123 MMHG | HEART RATE: 91 BPM

## 2022-12-09 DIAGNOSIS — Z92.25 PERSONAL HISTORY OF IMMUNOSUPPRESSION THERAPY: ICD-10-CM

## 2022-12-09 DIAGNOSIS — K50.80 CROHN'S DISEASE OF BOTH SMALL AND LARGE INTESTINE WITHOUT CO: ICD-10-CM

## 2022-12-09 PROCEDURE — 99214 OFFICE O/P EST MOD 30 MIN: CPT | Performed by: INTERNAL MEDICINE

## 2023-04-21 ENCOUNTER — OFFICE (OUTPATIENT)
Dept: URBAN - METROPOLITAN AREA CLINIC 65 | Facility: CLINIC | Age: 58
End: 2023-04-21
Payer: COMMERCIAL

## 2023-04-21 VITALS
HEIGHT: 72 IN | SYSTOLIC BLOOD PRESSURE: 134 MMHG | WEIGHT: 200 LBS | HEART RATE: 84 BPM | DIASTOLIC BLOOD PRESSURE: 92 MMHG

## 2023-04-21 DIAGNOSIS — Z92.25 PERSONAL HISTORY OF IMMUNOSUPPRESSION THERAPY: ICD-10-CM

## 2023-04-21 DIAGNOSIS — K50.80 CROHN'S DISEASE OF BOTH SMALL AND LARGE INTESTINE WITHOUT CO: ICD-10-CM

## 2023-04-21 DIAGNOSIS — R14.1 GAS PAIN: ICD-10-CM

## 2023-04-21 DIAGNOSIS — R63.4 ABNORMAL WEIGHT LOSS: ICD-10-CM

## 2023-04-21 DIAGNOSIS — K21.9 GASTRO-ESOPHAGEAL REFLUX DISEASE WITHOUT ESOPHAGITIS: ICD-10-CM

## 2023-04-21 DIAGNOSIS — F43.9 REACTION TO SEVERE STRESS, UNSPECIFIED: ICD-10-CM

## 2023-04-21 PROCEDURE — 99214 OFFICE O/P EST MOD 30 MIN: CPT | Performed by: INTERNAL MEDICINE

## 2023-04-21 RX ORDER — CYANOCOBALAMIN 1000 UG/ML
INJECTION, SOLUTION INTRAMUSCULAR
Qty: 3 | Refills: 4 | Status: ACTIVE

## 2024-04-12 ENCOUNTER — OFFICE (OUTPATIENT)
Dept: URBAN - METROPOLITAN AREA CLINIC 65 | Facility: CLINIC | Age: 59
End: 2024-04-12

## 2024-04-12 VITALS
HEIGHT: 72 IN | WEIGHT: 209 LBS | HEART RATE: 81 BPM | DIASTOLIC BLOOD PRESSURE: 74 MMHG | SYSTOLIC BLOOD PRESSURE: 128 MMHG

## 2024-04-12 DIAGNOSIS — K50.80 CROHN'S DISEASE OF BOTH SMALL AND LARGE INTESTINE WITHOUT CO: ICD-10-CM

## 2024-04-12 DIAGNOSIS — K21.9 GASTRO-ESOPHAGEAL REFLUX DISEASE WITHOUT ESOPHAGITIS: ICD-10-CM

## 2024-04-12 DIAGNOSIS — E53.8 DEFICIENCY OF OTHER SPECIFIED B GROUP VITAMINS: ICD-10-CM

## 2024-04-12 DIAGNOSIS — K52.9 NONINFECTIVE GASTROENTERITIS AND COLITIS, UNSPECIFIED: ICD-10-CM

## 2024-04-12 DIAGNOSIS — Z92.25 PERSONAL HISTORY OF IMMUNOSUPPRESSION THERAPY: ICD-10-CM

## 2024-04-12 PROCEDURE — 99214 OFFICE O/P EST MOD 30 MIN: CPT | Performed by: INTERNAL MEDICINE

## 2024-06-13 RX ORDER — BUSPIRONE HYDROCHLORIDE 10 MG/1
10 TABLET ORAL 2 TIMES DAILY
COMMUNITY

## 2024-06-13 RX ORDER — LAMOTRIGINE 25 MG/1
25 TABLET ORAL NIGHTLY
COMMUNITY

## 2024-06-13 RX ORDER — LOSARTAN POTASSIUM 25 MG/1
25 TABLET ORAL DAILY
COMMUNITY

## 2024-06-14 ENCOUNTER — ANESTHESIA EVENT (OUTPATIENT)
Dept: GASTROENTEROLOGY | Facility: HOSPITAL | Age: 59
End: 2024-06-14
Payer: MEDICAID

## 2024-06-14 ENCOUNTER — ANESTHESIA (OUTPATIENT)
Dept: GASTROENTEROLOGY | Facility: HOSPITAL | Age: 59
End: 2024-06-14
Payer: MEDICAID

## 2024-06-14 ENCOUNTER — HOSPITAL ENCOUNTER (OUTPATIENT)
Facility: HOSPITAL | Age: 59
Setting detail: HOSPITAL OUTPATIENT SURGERY
Discharge: HOME | End: 2024-06-14
Attending: INTERNAL MEDICINE | Admitting: INTERNAL MEDICINE
Payer: MEDICAID

## 2024-06-14 ENCOUNTER — ON CAMPUS - OUTPATIENT (OUTPATIENT)
Dept: URBAN - METROPOLITAN AREA HOSPITAL 114 | Facility: HOSPITAL | Age: 59
End: 2024-06-14

## 2024-06-14 VITALS
RESPIRATION RATE: 16 BRPM | BODY MASS INDEX: 26.45 KG/M2 | OXYGEN SATURATION: 96 % | SYSTOLIC BLOOD PRESSURE: 106 MMHG | WEIGHT: 195.3 LBS | HEART RATE: 72 BPM | DIASTOLIC BLOOD PRESSURE: 86 MMHG | HEIGHT: 72 IN

## 2024-06-14 DIAGNOSIS — K62.4 STENOSIS OF ANUS AND RECTUM: ICD-10-CM

## 2024-06-14 DIAGNOSIS — Z87.19 HISTORY OF CROHN'S DISEASE: ICD-10-CM

## 2024-06-14 DIAGNOSIS — K63.3 ULCER OF INTESTINE: ICD-10-CM

## 2024-06-14 DIAGNOSIS — K50.10 CROHN'S DISEASE OF LARGE INTESTINE WITHOUT COMPLICATIONS: ICD-10-CM

## 2024-06-14 PROCEDURE — 88305 TISSUE EXAM BY PATHOLOGIST: CPT | Performed by: INTERNAL MEDICINE

## 2024-06-14 PROCEDURE — 25010000002 PROPOFOL 1000 MG/100ML EMULSION

## 2024-06-14 PROCEDURE — 25810000003 LACTATED RINGERS PER 1000 ML: Performed by: INTERNAL MEDICINE

## 2024-06-14 PROCEDURE — 45380 COLONOSCOPY AND BIOPSY: CPT | Performed by: INTERNAL MEDICINE

## 2024-06-14 RX ORDER — LIDOCAINE HYDROCHLORIDE 20 MG/ML
INJECTION, SOLUTION INFILTRATION; PERINEURAL AS NEEDED
Status: DISCONTINUED | OUTPATIENT
Start: 2024-06-14 | End: 2024-06-14 | Stop reason: SURG

## 2024-06-14 RX ORDER — PROPOFOL 10 MG/ML
INJECTION, EMULSION INTRAVENOUS CONTINUOUS PRN
Status: DISCONTINUED | OUTPATIENT
Start: 2024-06-14 | End: 2024-06-14 | Stop reason: SURG

## 2024-06-14 RX ORDER — SODIUM CHLORIDE, SODIUM LACTATE, POTASSIUM CHLORIDE, CALCIUM CHLORIDE 600; 310; 30; 20 MG/100ML; MG/100ML; MG/100ML; MG/100ML
30 INJECTION, SOLUTION INTRAVENOUS CONTINUOUS PRN
Status: DISCONTINUED | OUTPATIENT
Start: 2024-06-14 | End: 2024-06-14 | Stop reason: HOSPADM

## 2024-06-14 RX ADMIN — PROPOFOL INJECTABLE EMULSION 160 MCG/KG/MIN: 10 INJECTION, EMULSION INTRAVENOUS at 08:30

## 2024-06-14 RX ADMIN — PROPOFOL INJECTABLE EMULSION 100 MG: 10 INJECTION, EMULSION INTRAVENOUS at 08:29

## 2024-06-14 RX ADMIN — SODIUM CHLORIDE, POTASSIUM CHLORIDE, SODIUM LACTATE AND CALCIUM CHLORIDE 30 ML/HR: 600; 310; 30; 20 INJECTION, SOLUTION INTRAVENOUS at 08:08

## 2024-06-14 RX ADMIN — LIDOCAINE HYDROCHLORIDE 60 MG: 20 INJECTION, SOLUTION INFILTRATION; PERINEURAL at 08:29

## 2024-06-14 NOTE — H&P
[facility]   HISTORY AND PHYSICAL    Patient Name: Jeronimo Dai  : 1965  MRN: [id number]  Primary Care Physician:  Andria Wolfe APRN  Date of admission: 2024    Subjective   Subjective     Chief Complaint: longstanding crohns disease    History of Present Illness  Feeling well tolerating therapy ok   Review of Systems   All other systems reviewed and are negative.       Personal History     Past Medical History:   Diagnosis Date    Anxiety     Crohn's disease     DDD (degenerative disc disease), lumbosacral     Depression with anxiety     GERD (gastroesophageal reflux disease)     Hypertension     Insomnia     Neuropathy        Past Surgical History:   Procedure Laterality Date    APPENDECTOMY      CHOLECYSTECTOMY      COLONOSCOPY  2018    COLONOSCOPY N/A 2020    Procedure: COLONOSCOPY;  Surgeon: Sarthak Ruby MD;  Location: [facility] ENDOSCOPY;  Service: Gastroenterology;  Laterality: N/A;  pre- hx chron's, hx polyps  post- chron's exacerbation    HEMICOLECTOMY Bilateral     X2    LUMBAR DISC SURGERY      LUMBAR LAMINECTOMY DISCECTOMY DECOMPRESSION N/A 3/9/2017    Procedure: OPEN REVISION L5/S1 DISCECTOMY POSTERIOR AND L4-5 RIGHT;  Surgeon: Drew Taylor MD;  Location: [facility] MAIN OR;  Service:        Family History: family history includes Coronary artery disease in his mother; Dementia in his father; Heart failure in his father. Otherwise pertinent FHx was reviewed and not pertinent to current issue.    Social History:  reports that he has never smoked. He has never used smokeless tobacco. He reports current alcohol use. He reports current drug use.    Home Medications:  Adalimumab, Calcium Citrate-Vitamin D, Chlorpheniramine Maleate, DULoxetine, busPIRone, clonazePAM, cyanocobalamin, cyclobenzaprine, diphenhydrAMINE, diphenoxylate-atropine, gabapentin, lamoTRIgine, losartan, metoprolol succinate XL, omeprazole, promethazine, and zolpidem    Allergies:  Allergies   Allergen  Reactions    Lisinopril Cough    Augmentin [Amoxicillin-Pot Clavulanate] Other (See Comments) and Diarrhea     gi upset only not allergic  AVOIDS DUE TO CROHNS       Objective    Objective     Vitals:   Heart Rate:  [77] 77  Resp:  [11] 11  BP: (120)/(82) 120/82    Physical Exam  HENT:      Right Ear: External ear normal.      Left Ear: External ear normal.      Mouth/Throat:      Pharynx: Oropharynx is clear.   Eyes:      Conjunctiva/sclera: Conjunctivae normal.   Cardiovascular:      Rate and Rhythm: Normal rate.      Pulses: Normal pulses.   Pulmonary:      Effort: Pulmonary effort is normal.   Abdominal:      General: Abdomen is flat.   Skin:     General: Skin is warm and dry.   Neurological:      General: No focal deficit present.      Mental Status: He is alert.   Psychiatric:         Mood and Affect: Mood normal.         Result Review    Result Review:  I have personally reviewed the results from the time of this admission to 6/14/2024 08:31 EDT and agree with these findings:  []  Laboratory list / accordion  []  Microbiology  []  Radiology  []  EKG/Telemetry   []  Cardiology/Vascular   []  Pathology  []  Old records  []  Other:  Most notable findings include:       Assessment & Plan   Assessment / Plan     Brief Patient Summary:  Jeronimo Dai is a 58 y.o. male who   Longstanding crohns disease    Active Hospital Problems:  There are no active hospital problems to display for this patient.    Plan: Colonoscopy risks, alternatives and benefits discussed with patient and the patient is agreeable to having procedure done.      VTE Prophylaxis:  No VTE prophylaxis order currently exists.        CODE STATUS:       Admission Status:  I believe this patient meets outpatient  status.    Sarthak Ruby MD

## 2024-06-14 NOTE — DISCHARGE INSTRUCTIONS
For the next 24 hours patient needs to be with a responsible adult.    For 24 hours DO NOT drive, operate machinery, appliances, drink alcohol, make important decisions or sign legal documents.    Start with a light or bland diet if you are feeling sick to your stomach otherwise advance to regular diet as tolerated.    Follow recommendations on procedure report if provided by your doctor.    Call Dr Ruby [phone]     Problems may include but not limited to: large amounts of bleeding, trouble breathing, repeated vomiting, severe unrelieved pain, fever or chills.

## 2024-06-14 NOTE — ANESTHESIA PREPROCEDURE EVALUATION
Anesthesia Evaluation     Patient summary reviewed and Nursing notes reviewed   NPO Solid Status: > 8 hours  NPO Liquid Status: > 2 hours           Airway   Mallampati: II  TM distance: >3 FB  Neck ROM: full  Dental      Pulmonary - negative pulmonary ROS   Cardiovascular     (+) hypertension      Neuro/Psych  (+) psychiatric history Anxiety and Depression  GI/Hepatic/Renal/Endo    (+) GERD well controlled    Musculoskeletal     Abdominal    Substance History - negative use     OB/GYN          Other   arthritis,                       Anesthesia Plan    ASA 2     MAC     intravenous induction     Anesthetic plan, risks, benefits, and alternatives have been provided, discussed and informed consent has been obtained with: patient.        CODE STATUS:

## 2024-06-14 NOTE — ANESTHESIA POSTPROCEDURE EVALUATION
Patient: Jeronimo Dai    Procedure Summary       Date: 06/14/24 Room / Location:  [facility] /  [facility]    Anesthesia Start: 0823 Anesthesia Stop: 0852    Procedure: COLONOSCOPY TO CECUM WITH BIOPSIES Diagnosis:     Surgeons: Sarthak Ruby MD Provider: Graham Richardson MD    Anesthesia Type: MAC ASA Status: 2            Anesthesia Type: MAC    Vitals  Vitals Value Taken Time   /86 06/14/24 0910   Temp     Pulse 69 06/14/24 0911   Resp 16 06/14/24 0910   SpO2 100 % 06/14/24 0911   Vitals shown include unfiled device data.        Post Anesthesia Care and Evaluation    Level of consciousness: awake and alert  Pain management: adequate    Airway patency: patent  Anesthetic complications: No anesthetic complications  PONV Status: controlled  Cardiovascular status: blood pressure returned to baseline and acceptable  Respiratory status: acceptable  Hydration status: acceptable

## 2024-06-17 LAB
LAB AP CASE REPORT: NORMAL
LAB AP DIAGNOSIS COMMENT: NORMAL
PATH REPORT.FINAL DX SPEC: NORMAL
PATH REPORT.GROSS SPEC: NORMAL

## 2025-08-15 ENCOUNTER — OFFICE VISIT (OUTPATIENT)
Age: 60
End: 2025-08-15
Payer: COMMERCIAL

## 2025-08-15 VITALS
SYSTOLIC BLOOD PRESSURE: 110 MMHG | WEIGHT: 203.5 LBS | BODY MASS INDEX: 27.56 KG/M2 | DIASTOLIC BLOOD PRESSURE: 60 MMHG | OXYGEN SATURATION: 96 % | HEART RATE: 91 BPM | HEIGHT: 72 IN

## 2025-08-15 DIAGNOSIS — I10 PRIMARY HYPERTENSION: ICD-10-CM

## 2025-08-15 DIAGNOSIS — R07.2 PRECORDIAL PAIN: Primary | ICD-10-CM

## 2025-08-15 PROCEDURE — 93000 ELECTROCARDIOGRAM COMPLETE: CPT | Performed by: INTERNAL MEDICINE

## 2025-08-15 PROCEDURE — 99204 OFFICE O/P NEW MOD 45 MIN: CPT | Performed by: INTERNAL MEDICINE

## 2025-08-15 RX ORDER — ESCITALOPRAM OXALATE 20 MG/1
1 TABLET ORAL DAILY
COMMUNITY
Start: 2025-07-16

## 2025-08-15 RX ORDER — BACLOFEN 10 MG/1
10 TABLET ORAL 3 TIMES DAILY
COMMUNITY

## 2025-08-25 ENCOUNTER — TELEPHONE (OUTPATIENT)
Dept: CARDIOLOGY | Age: 60
End: 2025-08-25
Payer: COMMERCIAL

## 2025-08-26 ENCOUNTER — HOSPITAL ENCOUNTER (OUTPATIENT)
Dept: CARDIOLOGY | Facility: HOSPITAL | Age: 60
Discharge: HOME | End: 2025-08-26
Admitting: INTERNAL MEDICINE
Payer: COMMERCIAL

## 2025-08-26 VITALS
WEIGHT: 200 LBS | DIASTOLIC BLOOD PRESSURE: 77 MMHG | HEIGHT: 72 IN | HEART RATE: 88 BPM | BODY MASS INDEX: 27.09 KG/M2 | SYSTOLIC BLOOD PRESSURE: 123 MMHG

## 2025-08-26 VITALS — WEIGHT: 200 LBS | BODY MASS INDEX: 27.09 KG/M2 | HEIGHT: 72 IN

## 2025-08-26 DIAGNOSIS — R07.2 PRECORDIAL PAIN: ICD-10-CM

## 2025-08-26 LAB
ASCENDING AORTA: 3.8 CM
AV MEAN PRESS GRAD SYS DOP V1V2: 4.6 MMHG
AV VMAX SYS DOP: 139.6 CM/SEC
BH CV ECHO LEFT ATRIAL STRAIN: 31.6 %
BH CV ECHO LEFT VENTRICLE GLOBAL LONGITUDINAL STRAIN: -23.5 %
BH CV ECHO MEAS - ACS: 1.98 CM
BH CV ECHO MEAS - AI P1/2T: 587.8 MSEC
BH CV ECHO MEAS - AO MAX PG: 7.8 MMHG
BH CV ECHO MEAS - AO ROOT AREA (BSA CORRECTED): 1.5 CM2
BH CV ECHO MEAS - AO ROOT DIAM: 3.2 CM
BH CV ECHO MEAS - AO V2 VTI: 31.2 CM
BH CV ECHO MEAS - EDV(CUBED): 101.3 ML
BH CV ECHO MEAS - EDV(MOD-SP2): 76 ML
BH CV ECHO MEAS - EDV(MOD-SP4): 82 ML
BH CV ECHO MEAS - EF(MOD-SP2): 67.1 %
BH CV ECHO MEAS - EF(MOD-SP4): 69.5 %
BH CV ECHO MEAS - ESV(CUBED): 17 ML
BH CV ECHO MEAS - ESV(MOD-SP2): 25 ML
BH CV ECHO MEAS - ESV(MOD-SP4): 25 ML
BH CV ECHO MEAS - FS: 44.9 %
BH CV ECHO MEAS - IVS/LVPW: 0.97 CM
BH CV ECHO MEAS - IVSD: 0.77 CM
BH CV ECHO MEAS - LAT PEAK E' VEL: 8.9 CM/SEC
BH CV ECHO MEAS - LV DIASTOLIC VOL/BSA (35-75): 38.2 CM2
BH CV ECHO MEAS - LV MASS(C)D: 117.5 GRAMS
BH CV ECHO MEAS - LV SYSTOLIC VOL/BSA (12-30): 11.7 CM2
BH CV ECHO MEAS - LVIDD: 4.7 CM
BH CV ECHO MEAS - LVIDS: 2.6 CM
BH CV ECHO MEAS - LVOT AREA: 3.2 CM2
BH CV ECHO MEAS - LVOT DIAM: 2 CM
BH CV ECHO MEAS - LVPWD: 0.8 CM
BH CV ECHO MEAS - MED PEAK E' VEL: 7.9 CM/SEC
BH CV ECHO MEAS - MR MAX PG: 70 MMHG
BH CV ECHO MEAS - MR MAX VEL: 418.4 CM/SEC
BH CV ECHO MEAS - MV A DUR: 0.12 SEC
BH CV ECHO MEAS - MV A MAX VEL: 73.4 CM/SEC
BH CV ECHO MEAS - MV DEC SLOPE: 492.9 CM/SEC2
BH CV ECHO MEAS - MV DEC TIME: 0.17 SEC
BH CV ECHO MEAS - MV E MAX VEL: 91.7 CM/SEC
BH CV ECHO MEAS - MV E/A: 1.25
BH CV ECHO MEAS - MV MAX PG: 2.5 MMHG
BH CV ECHO MEAS - MV MEAN PG: 1.13 MMHG
BH CV ECHO MEAS - MV P1/2T: 46.5 MSEC
BH CV ECHO MEAS - MV V2 VTI: 19.9 CM
BH CV ECHO MEAS - MVA(P1/2T): 4.7 CM2
BH CV ECHO MEAS - PA V2 MAX: 106.5 CM/SEC
BH CV ECHO MEAS - PULM A REVS DUR: 0.12 SEC
BH CV ECHO MEAS - PULM A REVS VEL: 30.5 CM/SEC
BH CV ECHO MEAS - PULM DIAS VEL: 19.6 CM/SEC
BH CV ECHO MEAS - PULM S/D: 1.27
BH CV ECHO MEAS - PULM SYS VEL: 24.9 CM/SEC
BH CV ECHO MEAS - RAP SYSTOLE: 3 MMHG
BH CV ECHO MEAS - RV MAX PG: 1.23 MMHG
BH CV ECHO MEAS - RV V1 MAX: 55.5 CM/SEC
BH CV ECHO MEAS - RV V1 VTI: 10.1 CM
BH CV ECHO MEAS - RVOT DIAM: 1.84 CM
BH CV ECHO MEAS - RVSP: 26.5 MMHG
BH CV ECHO MEAS - SV(MOD-SP2): 51 ML
BH CV ECHO MEAS - SV(MOD-SP4): 57 ML
BH CV ECHO MEAS - SV(RVOT): 26.9 ML
BH CV ECHO MEAS - SVI(MOD-SP2): 23.8 ML/M2
BH CV ECHO MEAS - SVI(MOD-SP4): 26.6 ML/M2
BH CV ECHO MEAS - TAPSE (>1.6): 2.02 CM
BH CV ECHO MEAS - TR MAX PG: 23.5 MMHG
BH CV ECHO MEAS - TR MAX VEL: 242.2 CM/SEC
BH CV ECHO MEASUREMENTS AVERAGE E/E' RATIO: 10.92
BH CV NUCLEAR PRIOR STUDY: 2
BH CV REST NUCLEAR ISOTOPE DOSE: 11 MCI
BH CV STRESS BP STAGE 1: NORMAL
BH CV STRESS BP STAGE 2: NORMAL
BH CV STRESS BP STAGE 3: NORMAL
BH CV STRESS DURATION MIN STAGE 1: 3
BH CV STRESS DURATION MIN STAGE 2: 3
BH CV STRESS DURATION MIN STAGE 3: 1
BH CV STRESS DURATION SEC STAGE 1: 0
BH CV STRESS DURATION SEC STAGE 2: 0
BH CV STRESS DURATION SEC STAGE 3: 50
BH CV STRESS GRADE STAGE 1: 10
BH CV STRESS GRADE STAGE 2: 12
BH CV STRESS GRADE STAGE 3: 14
BH CV STRESS HR STAGE 1: 103
BH CV STRESS HR STAGE 2: 117
BH CV STRESS HR STAGE 3: 130
BH CV STRESS METS STAGE 1: 5
BH CV STRESS METS STAGE 2: 7.5
BH CV STRESS METS STAGE 3: 9
BH CV STRESS NUCLEAR ISOTOPE DOSE: 34.9 MCI
BH CV STRESS PROTOCOL 1: NORMAL
BH CV STRESS PROTOCOL 2 BP STAGE 1: NORMAL
BH CV STRESS PROTOCOL 2 COMMENTS STAGE 1: NORMAL
BH CV STRESS PROTOCOL 2 DOSE REGADENOSON STAGE 1: 0.4
BH CV STRESS PROTOCOL 2 DURATION MIN STAGE 1: 0
BH CV STRESS PROTOCOL 2 DURATION SEC STAGE 1: 10
BH CV STRESS PROTOCOL 2 HR STAGE 1: 130
BH CV STRESS PROTOCOL 2 STAGE 1: 1
BH CV STRESS PROTOCOL 2: NORMAL
BH CV STRESS RECOVERY BP: NORMAL MMHG
BH CV STRESS RECOVERY HR: 100 BPM
BH CV STRESS SPEED STAGE 1: 1.7
BH CV STRESS SPEED STAGE 2: 2.5
BH CV STRESS SPEED STAGE 3: 3.4
BH CV STRESS STAGE 1: 1
BH CV STRESS STAGE 2: 2
BH CV STRESS STAGE 3: 3
BH CV XLRA - RV BASE: 2.9 CM
BH CV XLRA - RV LENGTH: 5.6 CM
BH CV XLRA - RV MID: 2.37 CM
BH CV XLRA - TDI S': 12.3 CM/SEC
LEFT ATRIUM VOLUME INDEX: 20.4 ML/M2
LV EF BIPLANE MOD: 68.3 %
MAXIMAL PREDICTED HEART RATE: 159 BPM
PERCENT MAX PREDICTED HR: 81.76 %
SINUS: 3.5 CM
SPECT HRT GATED+EF W RNC IV: 76 %
STJ: 3.3 CM
STRESS BASELINE BP: NORMAL MMHG
STRESS BASELINE HR: 76 BPM
STRESS PERCENT HR: 96 %
STRESS POST EXERCISE DUR SEC: 10 SEC
STRESS POST PEAK BP: NORMAL MMHG
STRESS POST PEAK HR: 130 BPM
STRESS TARGET HR: 135 BPM

## 2025-08-26 PROCEDURE — 93017 CV STRESS TEST TRACING ONLY: CPT

## 2025-08-26 PROCEDURE — 25010000002 REGADENOSON 0.4 MG/5ML SOLUTION: Performed by: INTERNAL MEDICINE

## 2025-08-26 PROCEDURE — 34310000005 TECHNETIUM TETROFOSMIN KIT: Performed by: INTERNAL MEDICINE

## 2025-08-26 PROCEDURE — 78452 HT MUSCLE IMAGE SPECT MULT: CPT

## 2025-08-26 PROCEDURE — 93306 TTE W/DOPPLER COMPLETE: CPT

## 2025-08-26 PROCEDURE — 93356 MYOCRD STRAIN IMG SPCKL TRCK: CPT

## 2025-08-26 PROCEDURE — A9502 TC99M TETROFOSMIN: HCPCS | Performed by: INTERNAL MEDICINE

## 2025-08-26 RX ORDER — REGADENOSON 0.08 MG/ML
0.4 INJECTION, SOLUTION INTRAVENOUS
Status: COMPLETED | OUTPATIENT
Start: 2025-08-26 | End: 2025-08-26

## 2025-08-26 RX ADMIN — REGADENOSON 0.4 MG: 0.08 INJECTION, SOLUTION INTRAVENOUS at 08:50

## 2025-08-26 RX ADMIN — TETROFOSMIN 1 DOSE: 1.38 INJECTION, POWDER, LYOPHILIZED, FOR SOLUTION INTRAVENOUS at 07:48

## 2025-08-26 RX ADMIN — TETROFOSMIN 1 DOSE: 1.38 INJECTION, POWDER, LYOPHILIZED, FOR SOLUTION INTRAVENOUS at 08:50

## (undated) DEVICE — SENSR O2 OXIMAX FNGR A/ 18IN NONSTR

## (undated) DEVICE — THE TORRENT IRRIGATION SCOPE CONNECTOR IS USED WITH THE TORRENT IRRIGATION TUBING TO PROVIDE IRRIGATION FLUIDS SUCH AS STERILE WATER DURING GASTROINTESTINAL ENDOSCOPIC PROCEDURES WHEN USED IN CONJUNCTION WITH AN IRRIGATION PUMP (OR ELECTROSURGICAL UNIT).: Brand: TORRENT

## (undated) DEVICE — PK NEURO SPINE 40

## (undated) DEVICE — KT ORCA ORCAPOD DISP STRL

## (undated) DEVICE — SINGLE-USE BIOPSY FORCEPS: Brand: RADIAL JAW 4

## (undated) DEVICE — ANTIBACTERIAL UNDYED BRAIDED (POLYGLACTIN 910), SYNTHETIC ABSORBABLE SUTURE: Brand: COATED VICRYL

## (undated) DEVICE — LN SMPL CO2 SHTRM SD STREAM W/M LUER

## (undated) DEVICE — TUBING, SUCTION, 1/4" X 10', STRAIGHT: Brand: MEDLINE

## (undated) DEVICE — CANN O2 ETCO2 FITS ALL CONN CO2 SMPL A/ 7IN DISP LF

## (undated) DEVICE — MARKR SKIN W/RULR AND LBL

## (undated) DEVICE — TOWEL,OR,DSP,ST,BLUE,STD,4/PK,20PK/CS: Brand: MEDLINE

## (undated) DEVICE — ADAPT CLN BIOGUARD AIR/H2O DISP

## (undated) DEVICE — DIFFUSER: Brand: CORE, MAESTRO

## (undated) DEVICE — 3.0MM PRECISION NEURO (MATCH HEAD)

## (undated) DEVICE — ENCORE® LATEX ORTHO SIZE 8, STERILE LATEX POWDER-FREE SURGICAL GLOVE: Brand: ENCORE

## (undated) DEVICE — OIL CARTRIDGE: Brand: CORE, MAESTRO

## (undated) DEVICE — GOWN,NON-REINFORCED,SIRUS,SET IN SLV,XXL: Brand: MEDLINE

## (undated) DEVICE — DISPOSABLE BIPOLAR FORCEPS 7 3/4" (19.7CM) SCOVILLE BAYONET, 1.5MM TIP AND 12 FT. (3.6M) CABLE: Brand: KIRWAN

## (undated) DEVICE — NDL SPINE 22G 31/2IN BLK

## (undated) DEVICE — APPL CHLORAPREP W/TINT 26ML ORNG

## (undated) DEVICE — SYR CONTRL LUERLOK 10CC

## (undated) DEVICE — GLV SURG BIOGEL LTX PF 7

## (undated) DEVICE — 6.0MM PRECISION ROUND

## (undated) DEVICE — DRSNG WND GZ PAD BORDERED 4X8IN STRL

## (undated) DEVICE — DRP MICROSCP LEICA W/GLASS LENS

## (undated) DEVICE — 3M™ STERI-STRIP™ REINFORCED ADHESIVE SKIN CLOSURES, R1547, 1/2 IN X 4 IN (12 MM X 100 MM), 6 STRIPS/ENVELOPE: Brand: 3M™ STERI-STRIP™

## (undated) DEVICE — DRP C/ARM 41X74IN